# Patient Record
Sex: MALE | Race: WHITE | NOT HISPANIC OR LATINO | Employment: FULL TIME
[De-identification: names, ages, dates, MRNs, and addresses within clinical notes are randomized per-mention and may not be internally consistent; named-entity substitution may affect disease eponyms.]

---

## 2017-01-09 ENCOUNTER — CHARTING TRANS (OUTPATIENT)
Dept: OTHER | Age: 57
End: 2017-01-09

## 2017-01-11 ENCOUNTER — LAB SERVICES (OUTPATIENT)
Dept: OTHER | Age: 57
End: 2017-01-11

## 2017-01-11 LAB
ALBUMIN SERPL BCG-MCNC: 4 G/DL (ref 3.6–5.1)
ALP SERPL-CCNC: 72 U/L (ref 30–130)
ALT SERPL W/O P-5'-P-CCNC: 49 U/L (ref 17–47)
AST SERPL-CCNC: 30 U/L (ref 14–43)
BASOPHIL %: 0.7 % (ref 0–1.2)
BASOPHIL ABSOLUTE #: 0 10*3/UL (ref 0–0.1)
BILIRUB SERPL-MCNC: 0.7 MG/DL (ref 0–1.3)
BUN SERPL-MCNC: 21 MG/DL (ref 6–27)
CALCIUM SERPL-MCNC: 9.1 MG/DL (ref 8.6–10.6)
CHLORIDE SERPL-SCNC: 104 MMOL/L (ref 96–107)
CHOLEST SERPL-MCNC: 186 MG/DL (ref 140–200)
CREATININE, SERUM: 1.1 MG/DL (ref 0.6–1.6)
DIFFERENTIAL TYPE: NORMAL
EOSINOPHIL %: 6 % (ref 0–10)
EOSINOPHIL ABSOLUTE #: 0.3 10*3/UL (ref 0–0.5)
GFR SERPL CREATININE-BSD FRML MDRD: >60 ML/MIN/{1.73M2}
GFR SERPL CREATININE-BSD FRML MDRD: >60 ML/MIN/{1.73M2}
GLUCOSE P FAST SERPL-MCNC: 104 MG/DL (ref 60–100)
HCO3 SERPL-SCNC: 27 MMOL/L (ref 22–32)
HDLC SERPL-MCNC: 49 MG/DL
HEMATOCRIT: 42.7 % (ref 40–51)
HEMOGLOBIN: 14.1 G/DL (ref 13.7–17.5)
LDLC SERPL CALC-MCNC: 124 MG/DL (ref 30–100)
LYMPH PERCENT: 34.7 % (ref 20.5–51.1)
LYMPHOCYTE ABSOLUTE #: 1.6 10*3/UL (ref 1.2–3.4)
MEAN CORPUSCULAR HGB CONCENTRATION: 33 % (ref 32–36)
MEAN CORPUSCULAR HGB: 30.7 PG (ref 27–34)
MEAN CORPUSCULAR VOLUME: 93 FL (ref 79–95)
MEAN PLATELET VOLUME: 11.7 FL (ref 8.6–12.4)
MONOCYTE ABSOLUTE #: 0.5 10*3/UL (ref 0.2–0.9)
MONOCYTE PERCENT: 11.3 % (ref 4.3–12.9)
NEUTROPHIL ABSOLUTE #: 2.1 10*3/UL (ref 1.4–6.5)
NEUTROPHIL PERCENT: 47.3 % (ref 34–73.5)
PLATELET COUNT: 200 10*3/UL (ref 150–400)
POTASSIUM SERPL-SCNC: 5.1 MMOL/L (ref 3.5–5.3)
PROT SERPL-MCNC: 7.1 G/DL (ref 6.4–8.5)
RED BLOOD CELL COUNT: 4.59 10*6/UL (ref 3.9–5.7)
RED CELL DISTRIBUTION WIDTH: 12.9 % (ref 11.3–14.8)
SODIUM SERPL-SCNC: 144 MMOL/L (ref 136–146)
TRIGL SERPL-MCNC: 66 MG/DL (ref 0–200)
TSH SERPL DL<=0.05 MIU/L-ACNC: 1.2 M[IU]/L (ref 0.3–4.82)
WHITE BLOOD CELL COUNT: 4.5 10*3/UL (ref 4–10)

## 2017-01-18 ENCOUNTER — CHARTING TRANS (OUTPATIENT)
Dept: FAMILY MEDICINE | Age: 57
End: 2017-01-18

## 2017-03-03 ENCOUNTER — CHARTING TRANS (OUTPATIENT)
Dept: OTHER | Age: 57
End: 2017-03-03

## 2017-03-14 ENCOUNTER — CHARTING TRANS (OUTPATIENT)
Dept: OTHER | Age: 57
End: 2017-03-14

## 2017-03-16 ENCOUNTER — CHARTING TRANS (OUTPATIENT)
Dept: FAMILY MEDICINE | Age: 57
End: 2017-03-16

## 2017-03-16 ENCOUNTER — CHARTING TRANS (OUTPATIENT)
Dept: OTHER | Age: 57
End: 2017-03-16

## 2017-03-16 ENCOUNTER — IMAGING SERVICES (OUTPATIENT)
Dept: OTHER | Age: 57
End: 2017-03-16

## 2017-03-21 ENCOUNTER — CHARTING TRANS (OUTPATIENT)
Dept: OTHER | Age: 57
End: 2017-03-21

## 2017-03-30 ENCOUNTER — CHARTING TRANS (OUTPATIENT)
Dept: OTHER | Age: 57
End: 2017-03-30

## 2017-04-11 ENCOUNTER — IMAGING SERVICES (OUTPATIENT)
Dept: OTHER | Age: 57
End: 2017-04-11

## 2017-04-12 ENCOUNTER — CHARTING TRANS (OUTPATIENT)
Dept: OTHER | Age: 57
End: 2017-04-12

## 2018-01-15 ENCOUNTER — CHARTING TRANS (OUTPATIENT)
Dept: OTHER | Age: 58
End: 2018-01-15

## 2018-01-29 ENCOUNTER — CHARTING TRANS (OUTPATIENT)
Dept: OTHER | Age: 58
End: 2018-01-29

## 2018-03-08 ENCOUNTER — MYAURORA ACCOUNT LINK (OUTPATIENT)
Dept: OTHER | Age: 58
End: 2018-03-08

## 2018-03-08 ENCOUNTER — CHARTING TRANS (OUTPATIENT)
Dept: OTHER | Age: 58
End: 2018-03-08

## 2018-03-27 ENCOUNTER — CHARTING TRANS (OUTPATIENT)
Dept: OTHER | Age: 58
End: 2018-03-27

## 2018-04-02 ENCOUNTER — CHARTING TRANS (OUTPATIENT)
Dept: OTHER | Age: 58
End: 2018-04-02

## 2018-04-12 ENCOUNTER — LAB SERVICES (OUTPATIENT)
Dept: OTHER | Age: 58
End: 2018-04-12

## 2018-04-12 LAB
ALBUMIN SERPL-MCNC: 4.3 G/DL (ref 3.6–5.1)
ALP SERPL-CCNC: 79 U/L (ref 30–130)
ALT SERPL-CCNC: 37 U/L (ref 17–47)
AST SERPL-CCNC: 29 U/L (ref 14–43)
BILIRUB SERPL-MCNC: 0.4 MG/DL (ref 0–1.3)
BUN SERPL-MCNC: 24 MG/DL (ref 6–27)
CALCIUM SERPL-MCNC: 9.3 MG/DL (ref 8.6–10.6)
CHLORIDE SERPL-SCNC: 105 MMOL/L (ref 96–107)
CHOLEST SERPL-MCNC: 199 MG/DL (ref 140–200)
CO2 SERPL-SCNC: 26 MMOL/L (ref 22–32)
CREAT SERPL-MCNC: 1.1 MG/DL (ref 0.6–1.6)
GFR SERPL CREATININE-BSD FRML MDRD: >60 ML/MIN/{1.73M2}
GFR SERPL CREATININE-BSD FRML MDRD: >60 ML/MIN/{1.73M2}
GLUCOSE P FAST SERPL-MCNC: 94 MG/DL (ref 60–100)
HDLC SERPL-MCNC: 51 MG/DL
LDLC SERPL CALC-MCNC: 130 MG/DL (ref 30–100)
POTASSIUM SERPL-SCNC: 4.5 MMOL/L (ref 3.5–5.3)
PROT SERPL-MCNC: 7.1 G/DL (ref 6.4–8.5)
PSA SERPL-MCNC: 0.42 NG/ML (ref 0–3.6)
SODIUM SERPL-SCNC: 140 MMOL/L (ref 136–146)
TRIGL SERPL-MCNC: 92 MG/DL (ref 0–200)

## 2018-04-16 ENCOUNTER — CHARTING TRANS (OUTPATIENT)
Dept: OTHER | Age: 58
End: 2018-04-16

## 2018-04-16 ENCOUNTER — LAB SERVICES (OUTPATIENT)
Dept: OTHER | Age: 58
End: 2018-04-16

## 2018-04-16 ENCOUNTER — MYAURORA ACCOUNT LINK (OUTPATIENT)
Dept: OTHER | Age: 58
End: 2018-04-16

## 2018-04-18 ENCOUNTER — CHARTING TRANS (OUTPATIENT)
Dept: OTHER | Age: 58
End: 2018-04-18

## 2018-04-19 LAB — AP REPORT: NORMAL

## 2018-04-20 ENCOUNTER — CHARTING TRANS (OUTPATIENT)
Dept: OTHER | Age: 58
End: 2018-04-20

## 2018-11-02 VITALS
HEIGHT: 72 IN | SYSTOLIC BLOOD PRESSURE: 130 MMHG | TEMPERATURE: 99 F | WEIGHT: 290 LBS | HEART RATE: 60 BPM | BODY MASS INDEX: 39.28 KG/M2 | RESPIRATION RATE: 18 BRPM | DIASTOLIC BLOOD PRESSURE: 84 MMHG

## 2018-11-28 VITALS — DIASTOLIC BLOOD PRESSURE: 80 MMHG | SYSTOLIC BLOOD PRESSURE: 142 MMHG

## 2018-11-28 VITALS
HEIGHT: 71 IN | OXYGEN SATURATION: 95 % | TEMPERATURE: 99 F | HEART RATE: 68 BPM | SYSTOLIC BLOOD PRESSURE: 138 MMHG | WEIGHT: 300 LBS | BODY MASS INDEX: 42 KG/M2 | DIASTOLIC BLOOD PRESSURE: 84 MMHG

## 2018-11-29 VITALS
DIASTOLIC BLOOD PRESSURE: 80 MMHG | HEART RATE: 70 BPM | WEIGHT: 305 LBS | TEMPERATURE: 97.7 F | OXYGEN SATURATION: 98 % | HEIGHT: 71 IN | BODY MASS INDEX: 42.7 KG/M2 | SYSTOLIC BLOOD PRESSURE: 126 MMHG

## 2019-03-26 RX ORDER — VALSARTAN AND HYDROCHLOROTHIAZIDE 80; 12.5 MG/1; MG/1
1 TABLET, FILM COATED ORAL DAILY
COMMUNITY
Start: 2018-03-08 | End: 2019-03-26 | Stop reason: SDUPTHER

## 2019-03-26 RX ORDER — OMEPRAZOLE 40 MG/1
40 CAPSULE, DELAYED RELEASE ORAL DAILY
Qty: 60 CAPSULE | Refills: 0 | Status: SHIPPED | OUTPATIENT
Start: 2019-03-26 | End: 2019-04-22 | Stop reason: SDUPTHER

## 2019-03-26 RX ORDER — OMEPRAZOLE 40 MG/1
40 CAPSULE, DELAYED RELEASE ORAL DAILY
COMMUNITY
Start: 2018-03-08 | End: 2019-03-26 | Stop reason: SDUPTHER

## 2019-03-26 RX ORDER — VALSARTAN AND HYDROCHLOROTHIAZIDE 80; 12.5 MG/1; MG/1
1 TABLET, FILM COATED ORAL DAILY
Qty: 60 TABLET | Refills: 0 | Status: SHIPPED | OUTPATIENT
Start: 2019-03-26 | End: 2019-04-22 | Stop reason: SDUPTHER

## 2019-04-22 ENCOUNTER — OFFICE VISIT (OUTPATIENT)
Dept: FAMILY MEDICINE | Age: 59
End: 2019-04-22

## 2019-04-22 VITALS
BODY MASS INDEX: 40.6 KG/M2 | SYSTOLIC BLOOD PRESSURE: 134 MMHG | OXYGEN SATURATION: 96 % | HEIGHT: 71 IN | WEIGHT: 290 LBS | TEMPERATURE: 98.4 F | DIASTOLIC BLOOD PRESSURE: 84 MMHG | HEART RATE: 68 BPM

## 2019-04-22 DIAGNOSIS — I10 ESSENTIAL HYPERTENSION: ICD-10-CM

## 2019-04-22 DIAGNOSIS — Z12.5 SCREENING FOR MALIGNANT NEOPLASM OF PROSTATE: ICD-10-CM

## 2019-04-22 DIAGNOSIS — E66.01 MORBID OBESITY (CMD): ICD-10-CM

## 2019-04-22 DIAGNOSIS — Z11.59 ENCOUNTER FOR HEPATITIS C SCREENING TEST FOR LOW RISK PATIENT: ICD-10-CM

## 2019-04-22 DIAGNOSIS — Z00.01 ENCOUNTER FOR GENERAL ADULT MEDICAL EXAMINATION WITH ABNORMAL FINDINGS: Primary | ICD-10-CM

## 2019-04-22 DIAGNOSIS — G47.33 OSA ON CPAP: ICD-10-CM

## 2019-04-22 PROCEDURE — 99396 PREV VISIT EST AGE 40-64: CPT | Performed by: FAMILY MEDICINE

## 2019-04-22 PROCEDURE — 3075F SYST BP GE 130 - 139MM HG: CPT | Performed by: FAMILY MEDICINE

## 2019-04-22 PROCEDURE — 3079F DIAST BP 80-89 MM HG: CPT | Performed by: FAMILY MEDICINE

## 2019-04-22 RX ORDER — OMEPRAZOLE 40 MG/1
40 CAPSULE, DELAYED RELEASE ORAL DAILY
Qty: 90 CAPSULE | Refills: 3 | Status: SHIPPED | OUTPATIENT
Start: 2019-04-22 | End: 2020-05-07 | Stop reason: SDUPTHER

## 2019-04-22 RX ORDER — VALSARTAN AND HYDROCHLOROTHIAZIDE 80; 12.5 MG/1; MG/1
1 TABLET, FILM COATED ORAL DAILY
Qty: 90 TABLET | Refills: 3 | Status: SHIPPED | OUTPATIENT
Start: 2019-04-22 | End: 2020-05-07 | Stop reason: SDUPTHER

## 2019-04-22 ASSESSMENT — PATIENT HEALTH QUESTIONNAIRE - PHQ9
SUM OF ALL RESPONSES TO PHQ9 QUESTIONS 1 AND 2: 1
1. LITTLE INTEREST OR PLEASURE IN DOING THINGS: NOT AT ALL
SUM OF ALL RESPONSES TO PHQ9 QUESTIONS 1 AND 2: 1
2. FEELING DOWN, DEPRESSED OR HOPELESS: SEVERAL DAYS

## 2019-09-25 ENCOUNTER — LAB SERVICES (OUTPATIENT)
Dept: LAB | Age: 59
End: 2019-09-25

## 2019-09-25 ENCOUNTER — OFFICE VISIT (OUTPATIENT)
Dept: FAMILY MEDICINE | Age: 59
End: 2019-09-25

## 2019-09-25 VITALS — TEMPERATURE: 96.7 F | OXYGEN SATURATION: 96 % | WEIGHT: 296.8 LBS | BODY MASS INDEX: 41.4 KG/M2

## 2019-09-25 DIAGNOSIS — H92.02 LEFT EAR PAIN: ICD-10-CM

## 2019-09-25 DIAGNOSIS — R59.9 ENLARGED LYMPH NODES: ICD-10-CM

## 2019-09-25 DIAGNOSIS — H53.9 VISUAL DISTURBANCE: Primary | ICD-10-CM

## 2019-09-25 LAB
BASOPHIL %: 0.5 % (ref 0–1.2)
BASOPHIL ABSOLUTE #: 0 10*3/UL (ref 0–0.1)
DIFFERENTIAL TYPE: NORMAL
EOSINOPHIL %: 5.2 % (ref 0–10)
EOSINOPHIL ABSOLUTE #: 0.3 10*3/UL (ref 0–0.5)
HEMATOCRIT: 44.1 % (ref 40–51)
HEMOGLOBIN: 14.3 G/DL (ref 13.7–17.5)
LYMPH PERCENT: 33.8 % (ref 20.5–51.1)
LYMPHOCYTE ABSOLUTE #: 2 10*3/UL (ref 1.2–3.4)
MEAN CORPUSCULAR HGB CONCENTRATION: 32.4 % (ref 32–36)
MEAN CORPUSCULAR HGB: 30.8 PG (ref 27–34)
MEAN CORPUSCULAR VOLUME: 94.8 FL (ref 79–95)
MEAN PLATELET VOLUME: 11.9 FL (ref 8.6–12.4)
MONOCYTE ABSOLUTE #: 0.6 10*3/UL (ref 0.2–0.9)
MONOCYTE PERCENT: 10.3 % (ref 4.3–12.9)
NEUTROPHIL ABSOLUTE #: 3 10*3/UL (ref 1.4–6.5)
NEUTROPHIL PERCENT: 50.2 % (ref 34–73.5)
PLATELET COUNT: 207 10*3/UL (ref 150–400)
RED BLOOD CELL COUNT: 4.65 10*6/UL (ref 3.9–5.7)
RED CELL DISTRIBUTION WIDTH: 13 % (ref 11.3–14.8)
WHITE BLOOD CELL COUNT: 6 10*3/UL (ref 4–10)

## 2019-09-25 PROCEDURE — 85025 COMPLETE CBC W/AUTO DIFF WBC: CPT | Performed by: NURSE PRACTITIONER

## 2019-09-25 PROCEDURE — 99213 OFFICE O/P EST LOW 20 MIN: CPT | Performed by: NURSE PRACTITIONER

## 2019-09-25 PROCEDURE — 36415 COLL VENOUS BLD VENIPUNCTURE: CPT | Performed by: NURSE PRACTITIONER

## 2019-09-25 ASSESSMENT — ENCOUNTER SYMPTOMS
GASTROINTESTINAL NEGATIVE: 1
CONSTITUTIONAL NEGATIVE: 1
RESPIRATORY NEGATIVE: 1

## 2019-09-30 ENCOUNTER — E-ADVICE (OUTPATIENT)
Dept: FAMILY MEDICINE | Age: 59
End: 2019-09-30

## 2019-09-30 DIAGNOSIS — H92.02 LEFT EAR PAIN: Primary | ICD-10-CM

## 2019-10-10 ENCOUNTER — OFFICE VISIT (OUTPATIENT)
Dept: OTOLARYNGOLOGY | Age: 59
End: 2019-10-10
Attending: NURSE PRACTITIONER

## 2019-10-10 ENCOUNTER — OFFICE VISIT (OUTPATIENT)
Dept: AUDIOLOGY | Age: 59
End: 2019-10-10
Attending: OTOLARYNGOLOGY

## 2019-10-10 VITALS — HEIGHT: 72 IN | BODY MASS INDEX: 39.28 KG/M2 | WEIGHT: 290 LBS

## 2019-10-10 DIAGNOSIS — H69.90 DYSFUNCTION OF EUSTACHIAN TUBE, UNSPECIFIED LATERALITY: ICD-10-CM

## 2019-10-10 DIAGNOSIS — H91.90 HEARING LOSS, UNSPECIFIED HEARING LOSS TYPE, UNSPECIFIED LATERALITY: Primary | ICD-10-CM

## 2019-10-10 DIAGNOSIS — K01.1 IMPACTED MOLAR: ICD-10-CM

## 2019-10-10 DIAGNOSIS — H90.3 SENSORINEURAL HEARING LOSS (SNHL) OF BOTH EARS: Primary | ICD-10-CM

## 2019-10-10 PROCEDURE — 92557 COMPREHENSIVE HEARING TEST: CPT | Performed by: AUDIOLOGIST

## 2019-10-10 PROCEDURE — 92567 TYMPANOMETRY: CPT | Performed by: AUDIOLOGIST

## 2019-10-10 PROCEDURE — 99244 OFF/OP CNSLTJ NEW/EST MOD 40: CPT | Performed by: OTOLARYNGOLOGY

## 2019-10-10 PROCEDURE — 92504 EAR MICROSCOPY EXAMINATION: CPT | Performed by: OTOLARYNGOLOGY

## 2019-10-10 RX ORDER — FLUTICASONE PROPIONATE 50 MCG
2 SPRAY, SUSPENSION (ML) NASAL DAILY
Qty: 1 BOTTLE | Refills: 3 | Status: SHIPPED | OUTPATIENT
Start: 2019-10-10 | End: 2022-05-23 | Stop reason: SDUPTHER

## 2019-11-27 ENCOUNTER — WALK IN (OUTPATIENT)
Dept: URGENT CARE | Age: 59
End: 2019-11-27

## 2019-11-27 VITALS
SYSTOLIC BLOOD PRESSURE: 128 MMHG | DIASTOLIC BLOOD PRESSURE: 88 MMHG | HEART RATE: 71 BPM | OXYGEN SATURATION: 98 % | RESPIRATION RATE: 18 BRPM | TEMPERATURE: 97.8 F

## 2019-11-27 DIAGNOSIS — B02.9 HERPES ZOSTER WITHOUT COMPLICATION: Primary | ICD-10-CM

## 2019-11-27 PROCEDURE — 99214 OFFICE O/P EST MOD 30 MIN: CPT | Performed by: NURSE PRACTITIONER

## 2019-11-27 RX ORDER — VALACYCLOVIR HYDROCHLORIDE 1 G/1
1000 TABLET, FILM COATED ORAL 3 TIMES DAILY
Qty: 21 TABLET | Refills: 0 | Status: SHIPPED | OUTPATIENT
Start: 2019-11-27 | End: 2019-12-04

## 2019-11-27 ASSESSMENT — ENCOUNTER SYMPTOMS
CONSTITUTIONAL NEGATIVE: 1
RESPIRATORY NEGATIVE: 1

## 2019-12-17 ENCOUNTER — WALK IN (OUTPATIENT)
Dept: URGENT CARE | Age: 59
End: 2019-12-17

## 2019-12-17 ENCOUNTER — IMAGING SERVICES (OUTPATIENT)
Dept: GENERAL RADIOLOGY | Age: 59
End: 2019-12-17
Attending: FAMILY MEDICINE

## 2019-12-17 DIAGNOSIS — S99.912A INJURY OF LEFT ANKLE, INITIAL ENCOUNTER: ICD-10-CM

## 2019-12-17 DIAGNOSIS — S99.912A INJURY OF LEFT ANKLE, INITIAL ENCOUNTER: Primary | ICD-10-CM

## 2019-12-17 DIAGNOSIS — S99.922A INJURY OF LEFT FOOT, INITIAL ENCOUNTER: ICD-10-CM

## 2019-12-17 PROCEDURE — 73610 X-RAY EXAM OF ANKLE: CPT | Performed by: RADIOLOGY

## 2019-12-17 PROCEDURE — 99214 OFFICE O/P EST MOD 30 MIN: CPT | Performed by: FAMILY MEDICINE

## 2019-12-17 PROCEDURE — 73630 X-RAY EXAM OF FOOT: CPT | Performed by: RADIOLOGY

## 2019-12-17 RX ORDER — FLUTICASONE PROPIONATE 50 MCG
SPRAY, SUSPENSION (ML) NASAL
Refills: 3 | COMMUNITY
Start: 2019-12-11 | End: 2020-05-12 | Stop reason: ALTCHOICE

## 2019-12-17 SDOH — HEALTH STABILITY: MENTAL HEALTH: HOW OFTEN DO YOU HAVE A DRINK CONTAINING ALCOHOL?: 4 OR MORE TIMES A WEEK

## 2019-12-17 ASSESSMENT — PAIN SCALES - GENERAL: PAINLEVEL: 1-2

## 2019-12-27 ENCOUNTER — E-ADVICE (OUTPATIENT)
Dept: FAMILY MEDICINE | Age: 59
End: 2019-12-27

## 2019-12-27 DIAGNOSIS — M79.672 LEFT FOOT PAIN: Primary | ICD-10-CM

## 2019-12-27 DIAGNOSIS — M79.673 PAIN OF FOOT AND TOES: ICD-10-CM

## 2019-12-27 DIAGNOSIS — M79.676 PAIN OF FOOT AND TOES: ICD-10-CM

## 2019-12-30 ENCOUNTER — E-ADVICE (OUTPATIENT)
Dept: FAMILY MEDICINE | Age: 59
End: 2019-12-30

## 2020-03-09 RX ORDER — FLUTICASONE PROPIONATE 50 MCG
SPRAY, SUSPENSION (ML) NASAL
Qty: 16 G | Refills: 0 | OUTPATIENT
Start: 2020-03-09

## 2020-04-07 RX ORDER — FLUTICASONE PROPIONATE 50 MCG
SPRAY, SUSPENSION (ML) NASAL
Qty: 16 G | OUTPATIENT
Start: 2020-04-07

## 2020-05-07 RX ORDER — OMEPRAZOLE 40 MG/1
40 CAPSULE, DELAYED RELEASE ORAL DAILY
Qty: 90 CAPSULE | Refills: 0 | Status: SHIPPED | OUTPATIENT
Start: 2020-05-07 | End: 2020-05-12 | Stop reason: SDUPTHER

## 2020-05-08 ENCOUNTER — E-ADVICE (OUTPATIENT)
Dept: FAMILY MEDICINE | Age: 60
End: 2020-05-08

## 2020-05-08 RX ORDER — VALSARTAN AND HYDROCHLOROTHIAZIDE 80; 12.5 MG/1; MG/1
1 TABLET, FILM COATED ORAL DAILY
Qty: 90 TABLET | Refills: 0 | Status: SHIPPED | OUTPATIENT
Start: 2020-05-08 | End: 2020-05-12 | Stop reason: SDUPTHER

## 2020-05-12 ENCOUNTER — V-VISIT (OUTPATIENT)
Dept: FAMILY MEDICINE | Age: 60
End: 2020-05-12

## 2020-05-12 DIAGNOSIS — K22.70 BARRETT'S ESOPHAGUS WITHOUT DYSPLASIA: ICD-10-CM

## 2020-05-12 DIAGNOSIS — I10 ESSENTIAL HYPERTENSION: Primary | ICD-10-CM

## 2020-05-12 DIAGNOSIS — E78.5 HYPERLIPIDEMIA, UNSPECIFIED HYPERLIPIDEMIA TYPE: ICD-10-CM

## 2020-05-12 DIAGNOSIS — Z12.5 SCREENING FOR MALIGNANT NEOPLASM OF PROSTATE: ICD-10-CM

## 2020-05-12 DIAGNOSIS — Z11.59 ENCOUNTER FOR HEPATITIS C SCREENING TEST FOR LOW RISK PATIENT: ICD-10-CM

## 2020-05-12 PROCEDURE — 99214 OFFICE O/P EST MOD 30 MIN: CPT | Performed by: FAMILY MEDICINE

## 2020-05-12 RX ORDER — VALSARTAN AND HYDROCHLOROTHIAZIDE 80; 12.5 MG/1; MG/1
1 TABLET, FILM COATED ORAL DAILY
Qty: 90 TABLET | Refills: 3 | Status: SHIPPED | OUTPATIENT
Start: 2020-05-12 | End: 2020-11-17 | Stop reason: SDUPTHER

## 2020-05-12 RX ORDER — OMEPRAZOLE 40 MG/1
40 CAPSULE, DELAYED RELEASE ORAL DAILY
Qty: 90 CAPSULE | Refills: 3 | Status: SHIPPED | OUTPATIENT
Start: 2020-05-12 | End: 2021-07-31

## 2020-05-22 ENCOUNTER — NURSE ONLY (OUTPATIENT)
Dept: FAMILY MEDICINE | Age: 60
End: 2020-05-22

## 2020-05-22 ENCOUNTER — LAB SERVICES (OUTPATIENT)
Dept: LAB | Age: 60
End: 2020-05-22

## 2020-05-22 VITALS — DIASTOLIC BLOOD PRESSURE: 82 MMHG | SYSTOLIC BLOOD PRESSURE: 122 MMHG | HEART RATE: 62 BPM

## 2020-05-22 DIAGNOSIS — I10 ESSENTIAL HYPERTENSION: ICD-10-CM

## 2020-05-22 DIAGNOSIS — Z12.5 SCREENING FOR MALIGNANT NEOPLASM OF PROSTATE: ICD-10-CM

## 2020-05-22 DIAGNOSIS — I10 ESSENTIAL HYPERTENSION: Primary | ICD-10-CM

## 2020-05-22 DIAGNOSIS — Z11.59 ENCOUNTER FOR HEPATITIS C SCREENING TEST FOR LOW RISK PATIENT: ICD-10-CM

## 2020-05-22 DIAGNOSIS — E78.5 HYPERLIPIDEMIA, UNSPECIFIED HYPERLIPIDEMIA TYPE: ICD-10-CM

## 2020-05-22 LAB
ALBUMIN SERPL-MCNC: 4.2 G/DL (ref 3.6–5.1)
ALP SERPL-CCNC: 69 U/L (ref 45–115)
ALT SERPL W/O P-5'-P-CCNC: 29 U/L (ref 5–49)
AST SERPL-CCNC: 27 U/L (ref 14–43)
BILIRUB SERPL-MCNC: 0.5 MG/DL (ref 0–1.3)
BUN SERPL-MCNC: 22 MG/DL (ref 6–27)
CALCIUM SERPL-MCNC: 9.6 MG/DL (ref 8.6–10.6)
CHLORIDE SERPL-SCNC: 103 MMOL/L (ref 96–107)
CHOLEST SERPL-MCNC: 189 MG/DL (ref 140–200)
CO2 SERPL-SCNC: 29 MMOL/L (ref 22–32)
CREAT SERPL-MCNC: 1.4 MG/DL (ref 0.6–1.6)
GFR SERPL CREATININE-BSD FRML MDRD: 52 ML/MIN/{1.73M2}
GFR SERPL CREATININE-BSD FRML MDRD: >60 ML/MIN/{1.73M2}
GLUCOSE P FAST SERPL-MCNC: 96 MG/DL (ref 60–100)
HDLC SERPL-MCNC: 53 MG/DL
HEPATITIS C ANTIBODY: NEGATIVE
LDLC SERPL CALC-MCNC: 125 MG/DL (ref 30–100)
POTASSIUM SERPL-SCNC: 4.5 MMOL/L (ref 3.5–5.3)
PROT SERPL-MCNC: 6.9 G/DL (ref 6.4–8.5)
PSA SERPL-MCNC: 0.43 NG/ML (ref 0–3.8)
SODIUM SERPL-SCNC: 137 MMOL/L (ref 136–146)
TRIGL SERPL-MCNC: 57 MG/DL (ref 0–200)

## 2020-05-22 PROCEDURE — 86803 HEPATITIS C AB TEST: CPT | Performed by: FAMILY MEDICINE

## 2020-05-22 PROCEDURE — 36415 COLL VENOUS BLD VENIPUNCTURE: CPT | Performed by: FAMILY MEDICINE

## 2020-05-22 PROCEDURE — 80061 LIPID PANEL: CPT | Performed by: FAMILY MEDICINE

## 2020-05-22 PROCEDURE — G0103 PSA SCREENING: HCPCS | Performed by: FAMILY MEDICINE

## 2020-05-22 PROCEDURE — 80053 COMPREHEN METABOLIC PANEL: CPT | Performed by: FAMILY MEDICINE

## 2020-06-29 ENCOUNTER — E-ADVICE (OUTPATIENT)
Dept: FAMILY MEDICINE | Age: 60
End: 2020-06-29

## 2020-06-29 ENCOUNTER — TELEPHONE (OUTPATIENT)
Dept: FAMILY MEDICINE | Age: 60
End: 2020-06-29

## 2020-06-29 DIAGNOSIS — I10 ESSENTIAL HYPERTENSION: Primary | ICD-10-CM

## 2020-07-13 ENCOUNTER — OFFICE VISIT (OUTPATIENT)
Dept: FAMILY MEDICINE | Age: 60
End: 2020-07-13

## 2020-07-13 VITALS
BODY MASS INDEX: 37.84 KG/M2 | DIASTOLIC BLOOD PRESSURE: 78 MMHG | TEMPERATURE: 98 F | RESPIRATION RATE: 20 BRPM | OXYGEN SATURATION: 99 % | HEART RATE: 58 BPM | SYSTOLIC BLOOD PRESSURE: 142 MMHG | WEIGHT: 279 LBS

## 2020-07-13 DIAGNOSIS — I10 ESSENTIAL HYPERTENSION: ICD-10-CM

## 2020-07-13 DIAGNOSIS — N52.9 ERECTILE DYSFUNCTION, UNSPECIFIED ERECTILE DYSFUNCTION TYPE: ICD-10-CM

## 2020-07-13 DIAGNOSIS — M79.671 BILATERAL FOOT PAIN: Primary | ICD-10-CM

## 2020-07-13 DIAGNOSIS — F90.9 ADULT ADHD: ICD-10-CM

## 2020-07-13 DIAGNOSIS — M79.672 BILATERAL FOOT PAIN: Primary | ICD-10-CM

## 2020-07-13 PROCEDURE — 99214 OFFICE O/P EST MOD 30 MIN: CPT | Performed by: NURSE PRACTITIONER

## 2020-07-13 PROCEDURE — 3078F DIAST BP <80 MM HG: CPT | Performed by: NURSE PRACTITIONER

## 2020-07-13 PROCEDURE — 3077F SYST BP >= 140 MM HG: CPT | Performed by: NURSE PRACTITIONER

## 2020-07-13 RX ORDER — DEXTROAMPHETAMINE SACCHARATE, AMPHETAMINE ASPARTATE MONOHYDRATE, DEXTROAMPHETAMINE SULFATE AND AMPHETAMINE SULFATE 2.5; 2.5; 2.5; 2.5 MG/1; MG/1; MG/1; MG/1
10 CAPSULE, EXTENDED RELEASE ORAL DAILY
Qty: 30 CAPSULE | Refills: 0 | Status: SHIPPED | OUTPATIENT
Start: 2020-07-13 | End: 2020-08-19 | Stop reason: SDUPTHER

## 2020-07-13 RX ORDER — SILDENAFIL 100 MG/1
100 TABLET, FILM COATED ORAL PRN
Qty: 9 TABLET | Refills: 0 | Status: SHIPPED | OUTPATIENT
Start: 2020-07-13

## 2020-07-13 SDOH — HEALTH STABILITY: PHYSICAL HEALTH: ON AVERAGE, HOW MANY MINUTES DO YOU ENGAGE IN EXERCISE AT THIS LEVEL?: 60 MIN

## 2020-07-13 SDOH — HEALTH STABILITY: PHYSICAL HEALTH: ON AVERAGE, HOW MANY DAYS PER WEEK DO YOU ENGAGE IN MODERATE TO STRENUOUS EXERCISE (LIKE A BRISK WALK)?: 7 DAYS

## 2020-07-13 ASSESSMENT — PATIENT HEALTH QUESTIONNAIRE - PHQ9
1. LITTLE INTEREST OR PLEASURE IN DOING THINGS: SEVERAL DAYS
7. TROUBLE CONCENTRATING ON THINGS, SUCH AS READING THE NEWSPAPER OR WATCHING TELEVISION: NEARLY EVERY DAY
4. FEELING TIRED OR HAVING LITTLE ENERGY: NEARLY EVERY DAY
CLINICAL INTERPRETATION OF PHQ2 SCORE: NO FURTHER SCREENING NEEDED
2. FEELING DOWN, DEPRESSED OR HOPELESS: SEVERAL DAYS
5. POOR APPETITE, WEIGHT LOSS, OR OVEREATING: NOT AT ALL
8. MOVING OR SPEAKING SO SLOWLY THAT OTHER PEOPLE COULD HAVE NOTICED. OR THE OPPOSITE, BEING SO FIGETY OR RESTLESS THAT YOU HAVE BEEN MOVING AROUND A LOT MORE THAN USUAL: NEARLY EVERY DAY
10. IF YOU CHECKED OFF ANY PROBLEMS, HOW DIFFICULT HAVE THESE PROBLEMS MADE IT FOR YOU TO DO YOUR WORK, TAKE CARE OF THINGS AT HOME, OR GET ALONG WITH OTHER PEOPLE: NOT DIFFICULT AT ALL
CLINICAL INTERPRETATION OF PHQ9 SCORE: MODERATE DEPRESSION
CLINICAL INTERPRETATION OF PHQ2 SCORE: MODERATE DEPRESSION
SUM OF ALL RESPONSES TO PHQ9 QUESTIONS 1 AND 2: 2
9. THOUGHTS THAT YOU WOULD BE BETTER OFF DEAD, OR OF HURTING YOURSELF: NOT AT ALL
SUM OF ALL RESPONSES TO PHQ9 QUESTIONS 1 TO 9: 12
SUM OF ALL RESPONSES TO PHQ QUESTIONS 1-9: 12
CLINICAL INTERPRETATION OF PHQ9 SCORE: NO FURTHER SCREENING NEEDED
6. FEELING BAD ABOUT YOURSELF - OR THAT YOU ARE A FAILURE OR HAVE LET YOURSELF OR YOUR FAMILY DOWN: NOT AT ALL
SUM OF ALL RESPONSES TO PHQ9 QUESTIONS 1 AND 2: 2
3. TROUBLE FALLING OR STAYING ASLEEP OR SLEEPING TOO MUCH: SEVERAL DAYS

## 2020-07-13 ASSESSMENT — ENCOUNTER SYMPTOMS
EYES NEGATIVE: 1
GASTROINTESTINAL NEGATIVE: 1
CONSTITUTIONAL NEGATIVE: 1
RESPIRATORY NEGATIVE: 1
NEUROLOGICAL NEGATIVE: 1

## 2020-07-29 ENCOUNTER — E-ADVICE (OUTPATIENT)
Dept: FAMILY MEDICINE | Age: 60
End: 2020-07-29

## 2020-08-19 ENCOUNTER — E-ADVICE (OUTPATIENT)
Dept: FAMILY MEDICINE | Age: 60
End: 2020-08-19

## 2020-08-19 DIAGNOSIS — F90.9 ADULT ADHD: ICD-10-CM

## 2020-08-19 RX ORDER — DEXTROAMPHETAMINE SACCHARATE, AMPHETAMINE ASPARTATE, DEXTROAMPHETAMINE SULFATE AND AMPHETAMINE SULFATE 2.5; 2.5; 2.5; 2.5 MG/1; MG/1; MG/1; MG/1
TABLET ORAL
Qty: 30 TABLET | Refills: 0 | Status: SHIPPED | OUTPATIENT
Start: 2020-08-19 | End: 2020-09-17 | Stop reason: SDUPTHER

## 2020-08-19 RX ORDER — DEXTROAMPHETAMINE SACCHARATE, AMPHETAMINE ASPARTATE MONOHYDRATE, DEXTROAMPHETAMINE SULFATE AND AMPHETAMINE SULFATE 2.5; 2.5; 2.5; 2.5 MG/1; MG/1; MG/1; MG/1
10 CAPSULE, EXTENDED RELEASE ORAL DAILY
Qty: 30 CAPSULE | Refills: 0 | Status: SHIPPED | OUTPATIENT
Start: 2020-08-19 | End: 2020-09-17 | Stop reason: SDUPTHER

## 2020-08-19 RX ORDER — DEXTROAMPHETAMINE SACCHARATE, AMPHETAMINE ASPARTATE MONOHYDRATE, DEXTROAMPHETAMINE SULFATE AND AMPHETAMINE SULFATE 2.5; 2.5; 2.5; 2.5 MG/1; MG/1; MG/1; MG/1
10 CAPSULE, EXTENDED RELEASE ORAL DAILY
Qty: 30 CAPSULE | Refills: 0 | OUTPATIENT
Start: 2020-08-19

## 2020-09-17 RX ORDER — DEXTROAMPHETAMINE SACCHARATE, AMPHETAMINE ASPARTATE, DEXTROAMPHETAMINE SULFATE AND AMPHETAMINE SULFATE 2.5; 2.5; 2.5; 2.5 MG/1; MG/1; MG/1; MG/1
TABLET ORAL
Qty: 30 TABLET | Refills: 0 | Status: SHIPPED | OUTPATIENT
Start: 2020-09-17 | End: 2020-10-22 | Stop reason: SDUPTHER

## 2020-09-17 RX ORDER — DEXTROAMPHETAMINE SACCHARATE, AMPHETAMINE ASPARTATE MONOHYDRATE, DEXTROAMPHETAMINE SULFATE AND AMPHETAMINE SULFATE 2.5; 2.5; 2.5; 2.5 MG/1; MG/1; MG/1; MG/1
10 CAPSULE, EXTENDED RELEASE ORAL DAILY
Qty: 30 CAPSULE | Refills: 0 | Status: SHIPPED | OUTPATIENT
Start: 2020-09-17 | End: 2020-10-22 | Stop reason: SDUPTHER

## 2020-10-22 ENCOUNTER — E-ADVICE (OUTPATIENT)
Dept: FAMILY MEDICINE | Age: 60
End: 2020-10-22

## 2020-10-22 DIAGNOSIS — F90.9 ADULT ADHD: ICD-10-CM

## 2020-10-26 RX ORDER — DEXTROAMPHETAMINE SACCHARATE, AMPHETAMINE ASPARTATE MONOHYDRATE, DEXTROAMPHETAMINE SULFATE AND AMPHETAMINE SULFATE 2.5; 2.5; 2.5; 2.5 MG/1; MG/1; MG/1; MG/1
10 CAPSULE, EXTENDED RELEASE ORAL DAILY
Qty: 30 CAPSULE | Refills: 0 | Status: SHIPPED | OUTPATIENT
Start: 2020-10-26 | End: 2020-11-17 | Stop reason: SDUPTHER

## 2020-10-26 RX ORDER — DEXTROAMPHETAMINE SACCHARATE, AMPHETAMINE ASPARTATE MONOHYDRATE, DEXTROAMPHETAMINE SULFATE AND AMPHETAMINE SULFATE 2.5; 2.5; 2.5; 2.5 MG/1; MG/1; MG/1; MG/1
10 CAPSULE, EXTENDED RELEASE ORAL DAILY
Qty: 30 CAPSULE | Refills: 0 | Status: SHIPPED | OUTPATIENT
Start: 2020-10-26 | End: 2020-10-26 | Stop reason: SDUPTHER

## 2020-10-26 RX ORDER — DEXTROAMPHETAMINE SACCHARATE, AMPHETAMINE ASPARTATE, DEXTROAMPHETAMINE SULFATE AND AMPHETAMINE SULFATE 2.5; 2.5; 2.5; 2.5 MG/1; MG/1; MG/1; MG/1
TABLET ORAL
Qty: 30 TABLET | Refills: 0 | Status: SHIPPED | OUTPATIENT
Start: 2020-10-26 | End: 2020-10-26 | Stop reason: SDUPTHER

## 2020-10-26 RX ORDER — DEXTROAMPHETAMINE SACCHARATE, AMPHETAMINE ASPARTATE, DEXTROAMPHETAMINE SULFATE AND AMPHETAMINE SULFATE 2.5; 2.5; 2.5; 2.5 MG/1; MG/1; MG/1; MG/1
TABLET ORAL
Qty: 30 TABLET | Refills: 0 | Status: SHIPPED | OUTPATIENT
Start: 2020-10-26 | End: 2020-11-17 | Stop reason: SDUPTHER

## 2020-11-17 ENCOUNTER — OFFICE VISIT (OUTPATIENT)
Dept: FAMILY MEDICINE | Age: 60
End: 2020-11-17

## 2020-11-17 VITALS
HEART RATE: 80 BPM | WEIGHT: 277.8 LBS | DIASTOLIC BLOOD PRESSURE: 90 MMHG | SYSTOLIC BLOOD PRESSURE: 122 MMHG | TEMPERATURE: 97.8 F | BODY MASS INDEX: 37.68 KG/M2 | RESPIRATION RATE: 20 BRPM

## 2020-11-17 DIAGNOSIS — Z23 NEED FOR VACCINATION: ICD-10-CM

## 2020-11-17 DIAGNOSIS — N52.9 ERECTILE DYSFUNCTION, UNSPECIFIED ERECTILE DYSFUNCTION TYPE: ICD-10-CM

## 2020-11-17 DIAGNOSIS — F90.9 ADULT ADHD: Primary | ICD-10-CM

## 2020-11-17 DIAGNOSIS — K22.70 BARRETT'S ESOPHAGUS WITHOUT DYSPLASIA: ICD-10-CM

## 2020-11-17 DIAGNOSIS — K21.9 GASTROESOPHAGEAL REFLUX DISEASE WITHOUT ESOPHAGITIS: ICD-10-CM

## 2020-11-17 DIAGNOSIS — I10 ESSENTIAL HYPERTENSION: ICD-10-CM

## 2020-11-17 PROCEDURE — 90686 IIV4 VACC NO PRSV 0.5 ML IM: CPT

## 2020-11-17 PROCEDURE — 99214 OFFICE O/P EST MOD 30 MIN: CPT | Performed by: NURSE PRACTITIONER

## 2020-11-17 PROCEDURE — 3074F SYST BP LT 130 MM HG: CPT | Performed by: NURSE PRACTITIONER

## 2020-11-17 PROCEDURE — 90471 IMMUNIZATION ADMIN: CPT

## 2020-11-17 RX ORDER — DEXTROAMPHETAMINE SACCHARATE, AMPHETAMINE ASPARTATE MONOHYDRATE, DEXTROAMPHETAMINE SULFATE AND AMPHETAMINE SULFATE 2.5; 2.5; 2.5; 2.5 MG/1; MG/1; MG/1; MG/1
10 CAPSULE, EXTENDED RELEASE ORAL DAILY
Qty: 30 CAPSULE | Refills: 0 | Status: SHIPPED | OUTPATIENT
Start: 2020-11-17 | End: 2020-11-25 | Stop reason: SDUPTHER

## 2020-11-17 RX ORDER — VALSARTAN AND HYDROCHLOROTHIAZIDE 80; 12.5 MG/1; MG/1
1 TABLET, FILM COATED ORAL DAILY
Qty: 90 TABLET | Refills: 3 | Status: SHIPPED | OUTPATIENT
Start: 2020-11-17 | End: 2021-08-23 | Stop reason: SDUPTHER

## 2020-11-17 RX ORDER — DEXTROAMPHETAMINE SACCHARATE, AMPHETAMINE ASPARTATE, DEXTROAMPHETAMINE SULFATE AND AMPHETAMINE SULFATE 2.5; 2.5; 2.5; 2.5 MG/1; MG/1; MG/1; MG/1
TABLET ORAL
Qty: 30 TABLET | Refills: 0 | Status: SHIPPED | OUTPATIENT
Start: 2020-11-17 | End: 2020-11-25 | Stop reason: SDUPTHER

## 2020-11-17 RX ORDER — FLUTICASONE PROPIONATE 50 MCG
2 SPRAY, SUSPENSION (ML) NASAL DAILY
Qty: 16 G | Refills: 12 | Status: SHIPPED | OUTPATIENT
Start: 2020-11-17 | End: 2021-08-23 | Stop reason: ALTCHOICE

## 2020-11-17 ASSESSMENT — PATIENT HEALTH QUESTIONNAIRE - PHQ9
SUM OF ALL RESPONSES TO PHQ9 QUESTIONS 1 AND 2: 0
CLINICAL INTERPRETATION OF PHQ2 SCORE: NO FURTHER SCREENING NEEDED
SUM OF ALL RESPONSES TO PHQ9 QUESTIONS 1 AND 2: 0
2. FEELING DOWN, DEPRESSED OR HOPELESS: NOT AT ALL
CLINICAL INTERPRETATION OF PHQ9 SCORE: NO FURTHER SCREENING NEEDED
1. LITTLE INTEREST OR PLEASURE IN DOING THINGS: NOT AT ALL

## 2020-11-17 ASSESSMENT — ENCOUNTER SYMPTOMS
RESPIRATORY NEGATIVE: 1
ALLERGIC/IMMUNOLOGIC NEGATIVE: 1
GASTROINTESTINAL NEGATIVE: 1
EYES NEGATIVE: 1
CONSTITUTIONAL NEGATIVE: 1
NEUROLOGICAL NEGATIVE: 1

## 2020-11-25 ENCOUNTER — TELEPHONE (OUTPATIENT)
Dept: FAMILY MEDICINE | Age: 60
End: 2020-11-25

## 2020-11-25 DIAGNOSIS — F90.9 ADULT ADHD: ICD-10-CM

## 2020-11-25 RX ORDER — DEXTROAMPHETAMINE SACCHARATE, AMPHETAMINE ASPARTATE, DEXTROAMPHETAMINE SULFATE AND AMPHETAMINE SULFATE 2.5; 2.5; 2.5; 2.5 MG/1; MG/1; MG/1; MG/1
TABLET ORAL
Qty: 30 TABLET | Refills: 0 | Status: SHIPPED | OUTPATIENT
Start: 2020-11-25 | End: 2020-11-27 | Stop reason: SDUPTHER

## 2020-11-25 RX ORDER — DEXTROAMPHETAMINE SACCHARATE, AMPHETAMINE ASPARTATE MONOHYDRATE, DEXTROAMPHETAMINE SULFATE AND AMPHETAMINE SULFATE 2.5; 2.5; 2.5; 2.5 MG/1; MG/1; MG/1; MG/1
10 CAPSULE, EXTENDED RELEASE ORAL DAILY
Qty: 30 CAPSULE | Refills: 0 | Status: SHIPPED | OUTPATIENT
Start: 2020-11-25 | End: 2020-11-27 | Stop reason: SDUPTHER

## 2020-11-27 RX ORDER — DEXTROAMPHETAMINE SACCHARATE, AMPHETAMINE ASPARTATE MONOHYDRATE, DEXTROAMPHETAMINE SULFATE AND AMPHETAMINE SULFATE 2.5; 2.5; 2.5; 2.5 MG/1; MG/1; MG/1; MG/1
10 CAPSULE, EXTENDED RELEASE ORAL DAILY
Qty: 30 CAPSULE | Refills: 0 | Status: SHIPPED | OUTPATIENT
Start: 2020-11-27 | End: 2020-12-22 | Stop reason: SDUPTHER

## 2020-11-27 RX ORDER — DEXTROAMPHETAMINE SACCHARATE, AMPHETAMINE ASPARTATE, DEXTROAMPHETAMINE SULFATE AND AMPHETAMINE SULFATE 2.5; 2.5; 2.5; 2.5 MG/1; MG/1; MG/1; MG/1
TABLET ORAL
Qty: 30 TABLET | Refills: 0 | Status: SHIPPED | OUTPATIENT
Start: 2020-11-27 | End: 2020-12-22 | Stop reason: SDUPTHER

## 2020-12-22 DIAGNOSIS — F90.9 ADULT ADHD: ICD-10-CM

## 2020-12-22 RX ORDER — DEXTROAMPHETAMINE SACCHARATE, AMPHETAMINE ASPARTATE, DEXTROAMPHETAMINE SULFATE AND AMPHETAMINE SULFATE 2.5; 2.5; 2.5; 2.5 MG/1; MG/1; MG/1; MG/1
TABLET ORAL
Qty: 30 TABLET | Refills: 0 | Status: SHIPPED | OUTPATIENT
Start: 2020-12-22 | End: 2021-01-25 | Stop reason: SDUPTHER

## 2020-12-22 RX ORDER — DEXTROAMPHETAMINE SACCHARATE, AMPHETAMINE ASPARTATE MONOHYDRATE, DEXTROAMPHETAMINE SULFATE AND AMPHETAMINE SULFATE 2.5; 2.5; 2.5; 2.5 MG/1; MG/1; MG/1; MG/1
10 CAPSULE, EXTENDED RELEASE ORAL DAILY
Qty: 30 CAPSULE | Refills: 0 | Status: SHIPPED | OUTPATIENT
Start: 2020-12-22 | End: 2021-01-25 | Stop reason: SDUPTHER

## 2021-01-25 DIAGNOSIS — F90.9 ADULT ADHD: ICD-10-CM

## 2021-01-26 RX ORDER — DEXTROAMPHETAMINE SACCHARATE, AMPHETAMINE ASPARTATE, DEXTROAMPHETAMINE SULFATE AND AMPHETAMINE SULFATE 2.5; 2.5; 2.5; 2.5 MG/1; MG/1; MG/1; MG/1
TABLET ORAL
Qty: 30 TABLET | Refills: 0 | Status: SHIPPED | OUTPATIENT
Start: 2021-01-26 | End: 2021-03-06 | Stop reason: SDUPTHER

## 2021-01-26 RX ORDER — DEXTROAMPHETAMINE SACCHARATE, AMPHETAMINE ASPARTATE MONOHYDRATE, DEXTROAMPHETAMINE SULFATE AND AMPHETAMINE SULFATE 2.5; 2.5; 2.5; 2.5 MG/1; MG/1; MG/1; MG/1
10 CAPSULE, EXTENDED RELEASE ORAL DAILY
Qty: 30 CAPSULE | Refills: 0 | Status: SHIPPED | OUTPATIENT
Start: 2021-01-26 | End: 2021-03-06 | Stop reason: SDUPTHER

## 2021-03-06 DIAGNOSIS — F90.9 ADULT ADHD: ICD-10-CM

## 2021-03-08 RX ORDER — DEXTROAMPHETAMINE SACCHARATE, AMPHETAMINE ASPARTATE MONOHYDRATE, DEXTROAMPHETAMINE SULFATE AND AMPHETAMINE SULFATE 2.5; 2.5; 2.5; 2.5 MG/1; MG/1; MG/1; MG/1
10 CAPSULE, EXTENDED RELEASE ORAL DAILY
Qty: 30 CAPSULE | Refills: 0 | Status: SHIPPED | OUTPATIENT
Start: 2021-03-08 | End: 2021-03-31 | Stop reason: SDUPTHER

## 2021-03-08 RX ORDER — DEXTROAMPHETAMINE SACCHARATE, AMPHETAMINE ASPARTATE, DEXTROAMPHETAMINE SULFATE AND AMPHETAMINE SULFATE 2.5; 2.5; 2.5; 2.5 MG/1; MG/1; MG/1; MG/1
TABLET ORAL
Qty: 30 TABLET | Refills: 0 | Status: SHIPPED | OUTPATIENT
Start: 2021-03-08 | End: 2021-03-31 | Stop reason: SDUPTHER

## 2021-03-23 ENCOUNTER — OFFICE VISIT (OUTPATIENT)
Dept: FAMILY MEDICINE | Age: 61
End: 2021-03-23

## 2021-03-23 VITALS
TEMPERATURE: 97.3 F | BODY MASS INDEX: 38.79 KG/M2 | RESPIRATION RATE: 20 BRPM | SYSTOLIC BLOOD PRESSURE: 118 MMHG | WEIGHT: 286 LBS | DIASTOLIC BLOOD PRESSURE: 78 MMHG | OXYGEN SATURATION: 97 % | HEART RATE: 75 BPM

## 2021-03-23 DIAGNOSIS — G47.09 OTHER INSOMNIA: ICD-10-CM

## 2021-03-23 DIAGNOSIS — F90.9 ADULT ADHD: Primary | ICD-10-CM

## 2021-03-23 DIAGNOSIS — I10 ESSENTIAL HYPERTENSION: ICD-10-CM

## 2021-03-23 DIAGNOSIS — Z12.5 SCREENING FOR MALIGNANT NEOPLASM OF PROSTATE: ICD-10-CM

## 2021-03-23 DIAGNOSIS — E78.5 HYPERLIPIDEMIA, UNSPECIFIED HYPERLIPIDEMIA TYPE: ICD-10-CM

## 2021-03-23 PROCEDURE — 3078F DIAST BP <80 MM HG: CPT | Performed by: NURSE PRACTITIONER

## 2021-03-23 PROCEDURE — 3074F SYST BP LT 130 MM HG: CPT | Performed by: NURSE PRACTITIONER

## 2021-03-23 PROCEDURE — 99214 OFFICE O/P EST MOD 30 MIN: CPT | Performed by: NURSE PRACTITIONER

## 2021-03-23 ASSESSMENT — PATIENT HEALTH QUESTIONNAIRE - PHQ9
CLINICAL INTERPRETATION OF PHQ9 SCORE: NO FURTHER SCREENING NEEDED
2. FEELING DOWN, DEPRESSED OR HOPELESS: SEVERAL DAYS
1. LITTLE INTEREST OR PLEASURE IN DOING THINGS: SEVERAL DAYS
SUM OF ALL RESPONSES TO PHQ9 QUESTIONS 1 AND 2: 2
SUM OF ALL RESPONSES TO PHQ9 QUESTIONS 1 AND 2: 2
CLINICAL INTERPRETATION OF PHQ2 SCORE: NO FURTHER SCREENING NEEDED

## 2021-03-23 ASSESSMENT — ENCOUNTER SYMPTOMS
ALLERGIC/IMMUNOLOGIC NEGATIVE: 1
CONSTITUTIONAL NEGATIVE: 1
NEUROLOGICAL NEGATIVE: 1
RESPIRATORY NEGATIVE: 1
ENDOCRINE NEGATIVE: 1
HEMATOLOGIC/LYMPHATIC NEGATIVE: 1
EYES NEGATIVE: 1
GASTROINTESTINAL NEGATIVE: 1
SLEEP DISTURBANCE: 1

## 2021-03-31 DIAGNOSIS — F90.9 ADULT ADHD: ICD-10-CM

## 2021-04-01 RX ORDER — DEXTROAMPHETAMINE SACCHARATE, AMPHETAMINE ASPARTATE, DEXTROAMPHETAMINE SULFATE AND AMPHETAMINE SULFATE 2.5; 2.5; 2.5; 2.5 MG/1; MG/1; MG/1; MG/1
TABLET ORAL
Qty: 30 TABLET | Refills: 0 | Status: SHIPPED | OUTPATIENT
Start: 2021-04-01 | End: 2021-04-27 | Stop reason: SDUPTHER

## 2021-04-01 RX ORDER — DEXTROAMPHETAMINE SACCHARATE, AMPHETAMINE ASPARTATE MONOHYDRATE, DEXTROAMPHETAMINE SULFATE AND AMPHETAMINE SULFATE 2.5; 2.5; 2.5; 2.5 MG/1; MG/1; MG/1; MG/1
10 CAPSULE, EXTENDED RELEASE ORAL DAILY
Qty: 30 CAPSULE | Refills: 0 | Status: SHIPPED | OUTPATIENT
Start: 2021-04-01 | End: 2021-04-27 | Stop reason: SDUPTHER

## 2021-04-05 DIAGNOSIS — F90.9 ADULT ADHD: ICD-10-CM

## 2021-04-05 RX ORDER — DEXTROAMPHETAMINE SACCHARATE, AMPHETAMINE ASPARTATE MONOHYDRATE, DEXTROAMPHETAMINE SULFATE AND AMPHETAMINE SULFATE 2.5; 2.5; 2.5; 2.5 MG/1; MG/1; MG/1; MG/1
10 CAPSULE, EXTENDED RELEASE ORAL DAILY
Qty: 30 CAPSULE | Refills: 0 | OUTPATIENT
Start: 2021-04-05

## 2021-04-05 RX ORDER — DEXTROAMPHETAMINE SACCHARATE, AMPHETAMINE ASPARTATE, DEXTROAMPHETAMINE SULFATE AND AMPHETAMINE SULFATE 2.5; 2.5; 2.5; 2.5 MG/1; MG/1; MG/1; MG/1
TABLET ORAL
Qty: 30 TABLET | Refills: 0 | OUTPATIENT
Start: 2021-04-05

## 2021-04-08 ENCOUNTER — E-ADVICE (OUTPATIENT)
Dept: FAMILY MEDICINE | Age: 61
End: 2021-04-08

## 2021-04-27 DIAGNOSIS — F90.9 ADULT ADHD: ICD-10-CM

## 2021-04-27 RX ORDER — DEXTROAMPHETAMINE SACCHARATE, AMPHETAMINE ASPARTATE MONOHYDRATE, DEXTROAMPHETAMINE SULFATE AND AMPHETAMINE SULFATE 2.5; 2.5; 2.5; 2.5 MG/1; MG/1; MG/1; MG/1
10 CAPSULE, EXTENDED RELEASE ORAL DAILY
Qty: 30 CAPSULE | Refills: 0 | Status: SHIPPED | OUTPATIENT
Start: 2021-04-27 | End: 2021-06-11 | Stop reason: SDUPTHER

## 2021-04-27 RX ORDER — DEXTROAMPHETAMINE SACCHARATE, AMPHETAMINE ASPARTATE, DEXTROAMPHETAMINE SULFATE AND AMPHETAMINE SULFATE 2.5; 2.5; 2.5; 2.5 MG/1; MG/1; MG/1; MG/1
TABLET ORAL
Qty: 30 TABLET | Refills: 0 | Status: SHIPPED | OUTPATIENT
Start: 2021-04-27 | End: 2021-06-11 | Stop reason: SDUPTHER

## 2021-05-25 VITALS
DIASTOLIC BLOOD PRESSURE: 78 MMHG | RESPIRATION RATE: 16 BRPM | SYSTOLIC BLOOD PRESSURE: 130 MMHG | HEART RATE: 60 BPM | TEMPERATURE: 98.3 F

## 2021-06-11 DIAGNOSIS — F90.9 ADULT ADHD: ICD-10-CM

## 2021-06-11 RX ORDER — DEXTROAMPHETAMINE SACCHARATE, AMPHETAMINE ASPARTATE MONOHYDRATE, DEXTROAMPHETAMINE SULFATE AND AMPHETAMINE SULFATE 2.5; 2.5; 2.5; 2.5 MG/1; MG/1; MG/1; MG/1
10 CAPSULE, EXTENDED RELEASE ORAL DAILY
Qty: 30 CAPSULE | Refills: 0 | Status: SHIPPED | OUTPATIENT
Start: 2021-06-11 | End: 2021-07-13 | Stop reason: SDUPTHER

## 2021-06-11 RX ORDER — DEXTROAMPHETAMINE SACCHARATE, AMPHETAMINE ASPARTATE, DEXTROAMPHETAMINE SULFATE AND AMPHETAMINE SULFATE 2.5; 2.5; 2.5; 2.5 MG/1; MG/1; MG/1; MG/1
TABLET ORAL
Qty: 30 TABLET | Refills: 0 | Status: SHIPPED | OUTPATIENT
Start: 2021-06-11 | End: 2021-07-13 | Stop reason: SDUPTHER

## 2021-07-13 DIAGNOSIS — F90.9 ADULT ADHD: ICD-10-CM

## 2021-07-14 RX ORDER — DEXTROAMPHETAMINE SACCHARATE, AMPHETAMINE ASPARTATE MONOHYDRATE, DEXTROAMPHETAMINE SULFATE AND AMPHETAMINE SULFATE 2.5; 2.5; 2.5; 2.5 MG/1; MG/1; MG/1; MG/1
10 CAPSULE, EXTENDED RELEASE ORAL DAILY
Qty: 30 CAPSULE | Refills: 0 | Status: SHIPPED | OUTPATIENT
Start: 2021-07-14 | End: 2021-08-23 | Stop reason: SDUPTHER

## 2021-07-14 RX ORDER — DEXTROAMPHETAMINE SACCHARATE, AMPHETAMINE ASPARTATE, DEXTROAMPHETAMINE SULFATE AND AMPHETAMINE SULFATE 2.5; 2.5; 2.5; 2.5 MG/1; MG/1; MG/1; MG/1
TABLET ORAL
Qty: 30 TABLET | Refills: 0 | Status: SHIPPED | OUTPATIENT
Start: 2021-07-14 | End: 2021-08-23 | Stop reason: SDUPTHER

## 2021-07-14 RX ORDER — DEXTROAMPHETAMINE SACCHARATE, AMPHETAMINE ASPARTATE, DEXTROAMPHETAMINE SULFATE AND AMPHETAMINE SULFATE 2.5; 2.5; 2.5; 2.5 MG/1; MG/1; MG/1; MG/1
TABLET ORAL
Qty: 30 TABLET | Refills: 0 | Status: SHIPPED | OUTPATIENT
Start: 2021-07-14 | End: 2021-07-14 | Stop reason: SDUPTHER

## 2021-07-14 RX ORDER — DEXTROAMPHETAMINE SACCHARATE, AMPHETAMINE ASPARTATE MONOHYDRATE, DEXTROAMPHETAMINE SULFATE AND AMPHETAMINE SULFATE 2.5; 2.5; 2.5; 2.5 MG/1; MG/1; MG/1; MG/1
10 CAPSULE, EXTENDED RELEASE ORAL DAILY
Qty: 30 CAPSULE | Refills: 0 | Status: SHIPPED | OUTPATIENT
Start: 2021-07-14 | End: 2021-07-14 | Stop reason: SDUPTHER

## 2021-07-14 RX ORDER — DEXTROAMPHETAMINE SACCHARATE, AMPHETAMINE ASPARTATE MONOHYDRATE, DEXTROAMPHETAMINE SULFATE AND AMPHETAMINE SULFATE 2.5; 2.5; 2.5; 2.5 MG/1; MG/1; MG/1; MG/1
10 CAPSULE, EXTENDED RELEASE ORAL DAILY
Qty: 30 CAPSULE | Refills: 0 | Status: SHIPPED | OUTPATIENT
Start: 2021-07-14 | End: 2021-08-18 | Stop reason: SDUPTHER

## 2021-07-14 RX ORDER — DEXTROAMPHETAMINE SACCHARATE, AMPHETAMINE ASPARTATE, DEXTROAMPHETAMINE SULFATE AND AMPHETAMINE SULFATE 2.5; 2.5; 2.5; 2.5 MG/1; MG/1; MG/1; MG/1
TABLET ORAL
Qty: 30 TABLET | Refills: 0 | Status: SHIPPED | OUTPATIENT
Start: 2021-07-14 | End: 2021-08-18 | Stop reason: SDUPTHER

## 2021-07-31 RX ORDER — OMEPRAZOLE 40 MG/1
40 CAPSULE, DELAYED RELEASE ORAL DAILY
Qty: 90 CAPSULE | Refills: 3 | Status: SHIPPED | OUTPATIENT
Start: 2021-07-31 | End: 2022-07-26

## 2021-08-18 DIAGNOSIS — F90.9 ADULT ADHD: ICD-10-CM

## 2021-08-20 RX ORDER — DEXTROAMPHETAMINE SACCHARATE, AMPHETAMINE ASPARTATE MONOHYDRATE, DEXTROAMPHETAMINE SULFATE AND AMPHETAMINE SULFATE 2.5; 2.5; 2.5; 2.5 MG/1; MG/1; MG/1; MG/1
10 CAPSULE, EXTENDED RELEASE ORAL DAILY
Qty: 30 CAPSULE | Refills: 0 | Status: SHIPPED | OUTPATIENT
Start: 2021-08-20 | End: 2021-08-23 | Stop reason: ALTCHOICE

## 2021-08-20 RX ORDER — DEXTROAMPHETAMINE SACCHARATE, AMPHETAMINE ASPARTATE, DEXTROAMPHETAMINE SULFATE AND AMPHETAMINE SULFATE 2.5; 2.5; 2.5; 2.5 MG/1; MG/1; MG/1; MG/1
TABLET ORAL
Qty: 30 TABLET | Refills: 0 | Status: SHIPPED | OUTPATIENT
Start: 2021-08-20 | End: 2021-08-23 | Stop reason: ALTCHOICE

## 2021-08-23 ENCOUNTER — OFFICE VISIT (OUTPATIENT)
Dept: FAMILY MEDICINE | Age: 61
End: 2021-08-23

## 2021-08-23 VITALS
HEART RATE: 73 BPM | TEMPERATURE: 97.1 F | WEIGHT: 273 LBS | OXYGEN SATURATION: 94 % | SYSTOLIC BLOOD PRESSURE: 130 MMHG | BODY MASS INDEX: 37.03 KG/M2 | DIASTOLIC BLOOD PRESSURE: 72 MMHG | RESPIRATION RATE: 20 BRPM

## 2021-08-23 DIAGNOSIS — F90.9 ADULT ADHD: ICD-10-CM

## 2021-08-23 DIAGNOSIS — K21.00 GASTROESOPHAGEAL REFLUX DISEASE WITH ESOPHAGITIS WITHOUT HEMORRHAGE: ICD-10-CM

## 2021-08-23 DIAGNOSIS — I10 ESSENTIAL HYPERTENSION: Primary | ICD-10-CM

## 2021-08-23 PROCEDURE — 99214 OFFICE O/P EST MOD 30 MIN: CPT | Performed by: NURSE PRACTITIONER

## 2021-08-23 PROCEDURE — 3075F SYST BP GE 130 - 139MM HG: CPT | Performed by: NURSE PRACTITIONER

## 2021-08-23 PROCEDURE — 3078F DIAST BP <80 MM HG: CPT | Performed by: NURSE PRACTITIONER

## 2021-08-23 RX ORDER — VALSARTAN AND HYDROCHLOROTHIAZIDE 80; 12.5 MG/1; MG/1
1 TABLET, FILM COATED ORAL DAILY
Qty: 90 TABLET | Refills: 3 | Status: SHIPPED | OUTPATIENT
Start: 2021-08-23 | End: 2022-04-07 | Stop reason: SDUPTHER

## 2021-08-23 SDOH — HEALTH STABILITY: PHYSICAL HEALTH: ON AVERAGE, HOW MANY DAYS PER WEEK DO YOU ENGAGE IN MODERATE TO STRENUOUS EXERCISE (LIKE A BRISK WALK)?: 6 DAYS

## 2021-08-23 SDOH — HEALTH STABILITY: PHYSICAL HEALTH: ON AVERAGE, HOW MANY MINUTES DO YOU ENGAGE IN EXERCISE AT THIS LEVEL?: 60 MIN

## 2021-08-23 ASSESSMENT — ANXIETY QUESTIONNAIRES
1. FEELING NERVOUS, ANXIOUS, OR ON EDGE: 0
GAD7 TOTAL SCORE: 8
3. WORRYING TOO MUCH ABOUT DIFFERENT THINGS: NEARLY EVERY DAY
5. BEING SO RESTLESS THAT IT IS HARD TO SIT STILL: NOT AT ALL
4. TROUBLE RELAXING: 0
2. NOT BEING ABLE TO STOP OR CONTROL WORRYING: NEARLY EVERY DAY
2. NOT BEING ABLE TO STOP OR CONTROL WORRYING: 3
7. FEELING AFRAID AS IF SOMETHING AWFUL MIGHT HAPPEN: MORE THAN HALF THE DAYS
6. BECOMING EASILY ANNOYED OR IRRITABLE: NOT AT ALL
1. FEELING NERVOUS, ANXIOUS, OR ON EDGE: NOT AT ALL
IF YOU CHECKED OFF ANY PROBLEMS ON THIS QUESTIONNAIRE, HOW DIFFICULT HAVE THESE PROBLEMS MADE IT FOR YOU TO DO YOUR WORK, TAKE CARE OF THINGS AT HOME, OR GET ALONG WITH OTHER PEOPLE: SOMEWHAT DIFFICULT
7. FEELING AFRAID AS IF SOMETHING AWFUL MIGHT HAPPEN: 2
5. BEING SO RESTLESS THAT IT IS HARD TO SIT STILL: 0
6. BECOMING EASILY ANNOYED OR IRRITABLE: 0
3. WORRYING TOO MUCH ABOUT DIFFERENT THINGS: 3
4. TROUBLE RELAXING: NOT AT ALL

## 2021-08-23 ASSESSMENT — PATIENT HEALTH QUESTIONNAIRE - PHQ9
SUM OF ALL RESPONSES TO PHQ9 QUESTIONS 1 AND 2: 1
SUM OF ALL RESPONSES TO PHQ9 QUESTIONS 1 AND 2: 0
1. LITTLE INTEREST OR PLEASURE IN DOING THINGS: NOT AT ALL
CLINICAL INTERPRETATION OF PHQ9 SCORE: NO FURTHER SCREENING NEEDED
CLINICAL INTERPRETATION OF PHQ2 SCORE: NO FURTHER SCREENING NEEDED
CLINICAL INTERPRETATION OF PHQ2 SCORE: NO FURTHER SCREENING NEEDED
2. FEELING DOWN, DEPRESSED OR HOPELESS: NOT AT ALL
1. LITTLE INTEREST OR PLEASURE IN DOING THINGS: NOT AT ALL
SUM OF ALL RESPONSES TO PHQ9 QUESTIONS 1 AND 2: 1
2. FEELING DOWN, DEPRESSED OR HOPELESS: SEVERAL DAYS

## 2021-08-27 ASSESSMENT — ENCOUNTER SYMPTOMS
CONSTITUTIONAL NEGATIVE: 1
HEMATOLOGIC/LYMPHATIC NEGATIVE: 1
RESPIRATORY NEGATIVE: 1
NEUROLOGICAL NEGATIVE: 1

## 2021-10-26 DIAGNOSIS — F90.9 ADULT ADHD: ICD-10-CM

## 2021-10-27 RX ORDER — DEXTROAMPHETAMINE SACCHARATE, AMPHETAMINE ASPARTATE, DEXTROAMPHETAMINE SULFATE AND AMPHETAMINE SULFATE 2.5; 2.5; 2.5; 2.5 MG/1; MG/1; MG/1; MG/1
TABLET ORAL
Qty: 30 TABLET | Refills: 0 | Status: SHIPPED | OUTPATIENT
Start: 2021-10-27 | End: 2021-11-30 | Stop reason: SDUPTHER

## 2021-10-27 RX ORDER — DEXTROAMPHETAMINE SACCHARATE, AMPHETAMINE ASPARTATE MONOHYDRATE, DEXTROAMPHETAMINE SULFATE AND AMPHETAMINE SULFATE 2.5; 2.5; 2.5; 2.5 MG/1; MG/1; MG/1; MG/1
10 CAPSULE, EXTENDED RELEASE ORAL DAILY
Qty: 30 CAPSULE | Refills: 0 | Status: SHIPPED | OUTPATIENT
Start: 2021-10-27 | End: 2021-11-30 | Stop reason: SDUPTHER

## 2021-11-30 DIAGNOSIS — F90.9 ADULT ADHD: ICD-10-CM

## 2021-11-30 RX ORDER — DEXTROAMPHETAMINE SACCHARATE, AMPHETAMINE ASPARTATE MONOHYDRATE, DEXTROAMPHETAMINE SULFATE AND AMPHETAMINE SULFATE 2.5; 2.5; 2.5; 2.5 MG/1; MG/1; MG/1; MG/1
10 CAPSULE, EXTENDED RELEASE ORAL DAILY
Qty: 30 CAPSULE | Refills: 0 | Status: SHIPPED | OUTPATIENT
Start: 2021-11-30 | End: 2021-12-29 | Stop reason: SDUPTHER

## 2021-11-30 RX ORDER — DEXTROAMPHETAMINE SACCHARATE, AMPHETAMINE ASPARTATE, DEXTROAMPHETAMINE SULFATE AND AMPHETAMINE SULFATE 2.5; 2.5; 2.5; 2.5 MG/1; MG/1; MG/1; MG/1
TABLET ORAL
Qty: 30 TABLET | Refills: 0 | Status: SHIPPED | OUTPATIENT
Start: 2021-11-30 | End: 2021-12-29 | Stop reason: SDUPTHER

## 2021-12-28 ENCOUNTER — TELEPHONE (OUTPATIENT)
Dept: FAMILY MEDICINE | Age: 61
End: 2021-12-28

## 2021-12-29 DIAGNOSIS — F90.9 ADULT ADHD: ICD-10-CM

## 2021-12-29 RX ORDER — DEXTROAMPHETAMINE SACCHARATE, AMPHETAMINE ASPARTATE MONOHYDRATE, DEXTROAMPHETAMINE SULFATE AND AMPHETAMINE SULFATE 2.5; 2.5; 2.5; 2.5 MG/1; MG/1; MG/1; MG/1
10 CAPSULE, EXTENDED RELEASE ORAL DAILY
Qty: 30 CAPSULE | Refills: 0 | Status: SHIPPED | OUTPATIENT
Start: 2021-12-29 | End: 2022-01-26 | Stop reason: SDUPTHER

## 2021-12-29 RX ORDER — DEXTROAMPHETAMINE SACCHARATE, AMPHETAMINE ASPARTATE, DEXTROAMPHETAMINE SULFATE AND AMPHETAMINE SULFATE 2.5; 2.5; 2.5; 2.5 MG/1; MG/1; MG/1; MG/1
TABLET ORAL
Qty: 30 TABLET | Refills: 0 | Status: SHIPPED | OUTPATIENT
Start: 2021-12-29 | End: 2022-01-26 | Stop reason: SDUPTHER

## 2022-01-26 DIAGNOSIS — F90.9 ADULT ADHD: ICD-10-CM

## 2022-01-27 RX ORDER — DEXTROAMPHETAMINE SACCHARATE, AMPHETAMINE ASPARTATE MONOHYDRATE, DEXTROAMPHETAMINE SULFATE AND AMPHETAMINE SULFATE 2.5; 2.5; 2.5; 2.5 MG/1; MG/1; MG/1; MG/1
10 CAPSULE, EXTENDED RELEASE ORAL DAILY
Qty: 30 CAPSULE | Refills: 0 | Status: SHIPPED | OUTPATIENT
Start: 2022-01-27 | End: 2022-03-07 | Stop reason: SDUPTHER

## 2022-01-27 RX ORDER — DEXTROAMPHETAMINE SACCHARATE, AMPHETAMINE ASPARTATE, DEXTROAMPHETAMINE SULFATE AND AMPHETAMINE SULFATE 2.5; 2.5; 2.5; 2.5 MG/1; MG/1; MG/1; MG/1
TABLET ORAL
Qty: 30 TABLET | Refills: 0 | Status: SHIPPED | OUTPATIENT
Start: 2022-01-27 | End: 2022-03-07 | Stop reason: SDUPTHER

## 2022-01-31 ENCOUNTER — LAB SERVICES (OUTPATIENT)
Dept: LAB | Age: 62
End: 2022-01-31

## 2022-01-31 DIAGNOSIS — I10 ESSENTIAL HYPERTENSION: ICD-10-CM

## 2022-01-31 LAB
ALBUMIN SERPL-MCNC: 4.1 G/DL (ref 3.6–5.1)
ALP SERPL-CCNC: 71 U/L (ref 45–115)
ALT SERPL W/O P-5'-P-CCNC: 31 U/L (ref 5–49)
AST SERPL-CCNC: 31 U/L (ref 14–43)
BILIRUB SERPL-MCNC: 0.6 MG/DL (ref 0–1.3)
BUN SERPL-MCNC: 19 MG/DL (ref 6–27)
CALCIUM SERPL-MCNC: 9.7 MG/DL (ref 8.6–10.6)
CHLORIDE SERPL-SCNC: 105 MMOL/L (ref 96–107)
CHOLEST SERPL-MCNC: 198 MG/DL (ref 140–200)
CO2 SERPL-SCNC: 29 MMOL/L (ref 22–32)
CREAT SERPL-MCNC: 1.3 MG/DL (ref 0.6–1.6)
GFR SERPL CREATININE-BSD FRML MDRD: 56 ML/MIN/{1.73M2}
GFR SERPL CREATININE-BSD FRML MDRD: >60 ML/MIN/{1.73M2}
GLUCOSE P FAST SERPL-MCNC: 107 MG/DL (ref 60–100)
HDLC SERPL-MCNC: 62 MG/DL
LDLC SERPL CALC-MCNC: 120 MG/DL (ref 30–100)
POTASSIUM SERPL-SCNC: 4.5 MMOL/L (ref 3.5–5.3)
PROT SERPL-MCNC: 6.8 G/DL (ref 6.4–8.5)
SODIUM SERPL-SCNC: 138 MMOL/L (ref 136–146)
TESTOST SERPL-MCNC: 541 NG/DL (ref 200–813)
TRIGL SERPL-MCNC: 79 MG/DL (ref 0–200)
TSH SERPL DL<=0.05 MIU/L-ACNC: 1.94 M[IU]/L (ref 0.3–4.82)

## 2022-01-31 PROCEDURE — 84443 ASSAY THYROID STIM HORMONE: CPT | Performed by: NURSE PRACTITIONER

## 2022-01-31 PROCEDURE — 84403 ASSAY OF TOTAL TESTOSTERONE: CPT | Performed by: NURSE PRACTITIONER

## 2022-01-31 PROCEDURE — 80061 LIPID PANEL: CPT | Performed by: NURSE PRACTITIONER

## 2022-01-31 PROCEDURE — 80053 COMPREHEN METABOLIC PANEL: CPT | Performed by: NURSE PRACTITIONER

## 2022-01-31 PROCEDURE — 36415 COLL VENOUS BLD VENIPUNCTURE: CPT | Performed by: NURSE PRACTITIONER

## 2022-02-07 ENCOUNTER — OFFICE VISIT (OUTPATIENT)
Dept: INTERNAL MEDICINE | Age: 62
End: 2022-02-07

## 2022-02-07 ENCOUNTER — APPOINTMENT (OUTPATIENT)
Dept: FAMILY MEDICINE | Age: 62
End: 2022-02-07

## 2022-02-07 VITALS
BODY MASS INDEX: 41.05 KG/M2 | HEIGHT: 71 IN | WEIGHT: 293.2 LBS | DIASTOLIC BLOOD PRESSURE: 86 MMHG | RESPIRATION RATE: 18 BRPM | HEART RATE: 68 BPM | TEMPERATURE: 96.7 F | SYSTOLIC BLOOD PRESSURE: 142 MMHG

## 2022-02-07 DIAGNOSIS — F41.8 DEPRESSION WITH ANXIETY: ICD-10-CM

## 2022-02-07 DIAGNOSIS — G47.33 OBSTRUCTIVE SLEEP APNEA: ICD-10-CM

## 2022-02-07 DIAGNOSIS — Z00.00 HEALTHCARE MAINTENANCE: Primary | ICD-10-CM

## 2022-02-07 DIAGNOSIS — R73.03 PREDIABETES: ICD-10-CM

## 2022-02-07 DIAGNOSIS — F90.9 ADULT ADHD: ICD-10-CM

## 2022-02-07 DIAGNOSIS — E66.01 CLASS 3 SEVERE OBESITY DUE TO EXCESS CALORIES WITH SERIOUS COMORBIDITY AND BODY MASS INDEX (BMI) OF 40.0 TO 44.9 IN ADULT (CMD): ICD-10-CM

## 2022-02-07 DIAGNOSIS — R94.4 DECREASED GFR: ICD-10-CM

## 2022-02-07 DIAGNOSIS — I10 ESSENTIAL HYPERTENSION: ICD-10-CM

## 2022-02-07 PROCEDURE — 99203 OFFICE O/P NEW LOW 30 MIN: CPT | Performed by: INTERNAL MEDICINE

## 2022-02-07 PROCEDURE — 99386 PREV VISIT NEW AGE 40-64: CPT | Performed by: INTERNAL MEDICINE

## 2022-02-07 PROCEDURE — 3077F SYST BP >= 140 MM HG: CPT | Performed by: INTERNAL MEDICINE

## 2022-02-07 PROCEDURE — 96127 BRIEF EMOTIONAL/BEHAV ASSMT: CPT | Performed by: INTERNAL MEDICINE

## 2022-02-07 PROCEDURE — 3079F DIAST BP 80-89 MM HG: CPT | Performed by: INTERNAL MEDICINE

## 2022-02-07 ASSESSMENT — PATIENT HEALTH QUESTIONNAIRE - PHQ9
SUM OF ALL RESPONSES TO PHQ9 QUESTIONS 1 AND 2: 3
2. FEELING DOWN, DEPRESSED OR HOPELESS: SEVERAL DAYS
CLINICAL INTERPRETATION OF PHQ2 SCORE: FURTHER SCREENING NEEDED
4. FEELING TIRED OR HAVING LITTLE ENERGY: NOT AT ALL
1. LITTLE INTEREST OR PLEASURE IN DOING THINGS: MORE THAN HALF THE DAYS
3. TROUBLE FALLING OR STAYING ASLEEP OR SLEEPING TOO MUCH: MORE THAN HALF THE DAYS
SUM OF ALL RESPONSES TO PHQ9 QUESTIONS 1 AND 2: 3
9. THOUGHTS THAT YOU WOULD BE BETTER OFF DEAD, OR OF HURTING YOURSELF: NOT AT ALL
CLINICAL INTERPRETATION OF PHQ9 SCORE: MODERATE DEPRESSION
7. TROUBLE CONCENTRATING ON THINGS, SUCH AS READING THE NEWSPAPER OR WATCHING TELEVISION: NOT AT ALL
5. POOR APPETITE, WEIGHT LOSS, OR OVEREATING: NEARLY EVERY DAY
SUM OF ALL RESPONSES TO PHQ QUESTIONS 1-9: 14
8. MOVING OR SPEAKING SO SLOWLY THAT OTHER PEOPLE COULD HAVE NOTICED. OR THE OPPOSITE, BEING SO FIGETY OR RESTLESS THAT YOU HAVE BEEN MOVING AROUND A LOT MORE THAN USUAL: NEARLY EVERY DAY
6. FEELING BAD ABOUT YOURSELF - OR THAT YOU ARE A FAILURE OR HAVE LET YOURSELF OR YOUR FAMILY DOWN: NEARLY EVERY DAY

## 2022-02-08 ASSESSMENT — ANXIETY QUESTIONNAIRES
1. FEELING NERVOUS, ANXIOUS, OR ON EDGE: 3
1. FEELING NERVOUS, ANXIOUS, OR ON EDGE: NEARLY EVERY DAY
3. WORRYING TOO MUCH ABOUT DIFFERENT THINGS: 3
2. NOT BEING ABLE TO STOP OR CONTROL WORRYING: 3
6. BECOMING EASILY ANNOYED OR IRRITABLE: 2
7. FEELING AFRAID AS IF SOMETHING AWFUL MIGHT HAPPEN: 2
6. BECOMING EASILY ANNOYED OR IRRITABLE: MORE THAN HALF THE DAYS
4. TROUBLE RELAXING: 2
4. TROUBLE RELAXING: MORE THAN HALF THE DAYS
2. NOT BEING ABLE TO STOP OR CONTROL WORRYING: NEARLY EVERY DAY
GAD7 TOTAL SCORE: 18
7. FEELING AFRAID AS IF SOMETHING AWFUL MIGHT HAPPEN: MORE THAN HALF THE DAYS
5. BEING SO RESTLESS THAT IT IS HARD TO SIT STILL: 3
3. WORRYING TOO MUCH ABOUT DIFFERENT THINGS: NEARLY EVERY DAY
5. BEING SO RESTLESS THAT IT IS HARD TO SIT STILL: NEARLY EVERY DAY

## 2022-03-07 ENCOUNTER — TELEPHONE (OUTPATIENT)
Dept: INTERNAL MEDICINE | Age: 62
End: 2022-03-07

## 2022-03-07 DIAGNOSIS — F90.9 ADULT ADHD: ICD-10-CM

## 2022-03-07 RX ORDER — FLUTICASONE PROPIONATE 50 MCG
2 SPRAY, SUSPENSION (ML) NASAL DAILY
OUTPATIENT
Start: 2022-03-07 | End: 2022-04-06

## 2022-03-09 ENCOUNTER — APPOINTMENT (OUTPATIENT)
Dept: INTERNAL MEDICINE | Age: 62
End: 2022-03-09

## 2022-03-09 ENCOUNTER — LAB SERVICES (OUTPATIENT)
Dept: LAB | Age: 62
End: 2022-03-09

## 2022-03-09 DIAGNOSIS — Z00.00 HEALTHCARE MAINTENANCE: ICD-10-CM

## 2022-03-09 LAB
HEMATOCRIT: 46.6 % (ref 40–51)
HEMOGLOBIN: 15.1 G/DL (ref 13.7–17.5)
MEAN CORPUSCULAR HGB CONCENTRATION: 32.4 % (ref 32–36)
MEAN CORPUSCULAR HGB: 32.1 PG (ref 27–34)
MEAN CORPUSCULAR VOLUME: 98.9 FL (ref 79–95)
MEAN PLATELET VOLUME: 11.8 FL (ref 8.6–12.4)
PLATELET COUNT: 218 10*3/UL (ref 150–400)
RED BLOOD CELL COUNT: 4.71 10*6/UL (ref 3.9–5.7)
RED CELL DISTRIBUTION WIDTH: 12.7 % (ref 11.3–14.8)
WHITE BLOOD CELL COUNT: 6.1 10*3/UL (ref 4–10)

## 2022-03-09 PROCEDURE — 80048 BASIC METABOLIC PNL TOTAL CA: CPT | Performed by: INTERNAL MEDICINE

## 2022-03-09 PROCEDURE — 36415 COLL VENOUS BLD VENIPUNCTURE: CPT | Performed by: INTERNAL MEDICINE

## 2022-03-09 PROCEDURE — 83036 HEMOGLOBIN GLYCOSYLATED A1C: CPT | Performed by: INTERNAL MEDICINE

## 2022-03-09 PROCEDURE — G0103 PSA SCREENING: HCPCS | Performed by: INTERNAL MEDICINE

## 2022-03-09 PROCEDURE — 85027 COMPLETE CBC AUTOMATED: CPT | Performed by: INTERNAL MEDICINE

## 2022-03-09 PROCEDURE — 84443 ASSAY THYROID STIM HORMONE: CPT | Performed by: INTERNAL MEDICINE

## 2022-03-09 RX ORDER — DEXTROAMPHETAMINE SACCHARATE, AMPHETAMINE ASPARTATE MONOHYDRATE, DEXTROAMPHETAMINE SULFATE AND AMPHETAMINE SULFATE 2.5; 2.5; 2.5; 2.5 MG/1; MG/1; MG/1; MG/1
10 CAPSULE, EXTENDED RELEASE ORAL DAILY
Qty: 30 CAPSULE | Refills: 0 | Status: SHIPPED | OUTPATIENT
Start: 2022-03-09 | End: 2022-04-06 | Stop reason: SDUPTHER

## 2022-03-09 RX ORDER — DEXTROAMPHETAMINE SACCHARATE, AMPHETAMINE ASPARTATE, DEXTROAMPHETAMINE SULFATE AND AMPHETAMINE SULFATE 2.5; 2.5; 2.5; 2.5 MG/1; MG/1; MG/1; MG/1
TABLET ORAL
Qty: 30 TABLET | Refills: 0 | Status: SHIPPED | OUTPATIENT
Start: 2022-03-09 | End: 2022-04-06 | Stop reason: SDUPTHER

## 2022-03-10 LAB
BUN SERPL-MCNC: 17 MG/DL (ref 6–27)
CALCIUM SERPL-MCNC: 9.9 MG/DL (ref 8.6–10.6)
CHLORIDE SERPL-SCNC: 100 MMOL/L (ref 96–107)
CO2 SERPL-SCNC: 28 MMOL/L (ref 22–32)
CREAT SERPL-MCNC: 1.2 MG/DL (ref 0.6–1.6)
EST. AVERAGE GLUCOSE BLD GHB EST-MCNC: 103 MG/DL (ref 0–154)
GFR SERPL CREATININE-BSD FRML MDRD: >60 ML/MIN/{1.73M2}
GFR SERPL CREATININE-BSD FRML MDRD: >60 ML/MIN/{1.73M2}
GLUCOSE SERPL-MCNC: 71 MG/DL (ref 70–200)
HBA1C MFR BLD: 5.2 % (ref 4.2–6)
POTASSIUM SERPL-SCNC: 4.1 MMOL/L (ref 3.5–5.3)
PSA SERPL-MCNC: 0.37 NG/ML (ref 0–4.1)
SODIUM SERPL-SCNC: 137 MMOL/L (ref 136–146)
TSH SERPL DL<=0.05 MIU/L-ACNC: 1.55 M[IU]/L (ref 0.3–4.82)

## 2022-04-06 DIAGNOSIS — F90.9 ADULT ADHD: ICD-10-CM

## 2022-04-06 RX ORDER — FLUTICASONE PROPIONATE 50 MCG
2 SPRAY, SUSPENSION (ML) NASAL DAILY
OUTPATIENT
Start: 2022-04-06 | End: 2022-05-06

## 2022-04-07 ENCOUNTER — OFFICE VISIT (OUTPATIENT)
Dept: INTERNAL MEDICINE | Age: 62
End: 2022-04-07

## 2022-04-07 VITALS
OXYGEN SATURATION: 98 % | RESPIRATION RATE: 16 BRPM | TEMPERATURE: 96.9 F | WEIGHT: 289.6 LBS | DIASTOLIC BLOOD PRESSURE: 90 MMHG | HEIGHT: 71 IN | HEART RATE: 68 BPM | BODY MASS INDEX: 40.54 KG/M2 | SYSTOLIC BLOOD PRESSURE: 142 MMHG

## 2022-04-07 DIAGNOSIS — K20.90 ESOPHAGITIS: Primary | ICD-10-CM

## 2022-04-07 DIAGNOSIS — I10 ESSENTIAL HYPERTENSION: ICD-10-CM

## 2022-04-07 DIAGNOSIS — E66.01 CLASS 3 SEVERE OBESITY DUE TO EXCESS CALORIES WITH SERIOUS COMORBIDITY AND BODY MASS INDEX (BMI) OF 40.0 TO 44.9 IN ADULT (CMD): ICD-10-CM

## 2022-04-07 PROCEDURE — 3077F SYST BP >= 140 MM HG: CPT | Performed by: INTERNAL MEDICINE

## 2022-04-07 PROCEDURE — 99214 OFFICE O/P EST MOD 30 MIN: CPT | Performed by: INTERNAL MEDICINE

## 2022-04-07 RX ORDER — VALSARTAN AND HYDROCHLOROTHIAZIDE 80; 12.5 MG/1; MG/1
2 TABLET, FILM COATED ORAL DAILY
Qty: 90 TABLET | Refills: 3 | Status: SHIPPED | COMMUNITY
Start: 2022-04-07

## 2022-04-07 ASSESSMENT — PATIENT HEALTH QUESTIONNAIRE - PHQ9
SUM OF ALL RESPONSES TO PHQ9 QUESTIONS 1 AND 2: 0
2. FEELING DOWN, DEPRESSED OR HOPELESS: NOT AT ALL
1. LITTLE INTEREST OR PLEASURE IN DOING THINGS: NOT AT ALL
CLINICAL INTERPRETATION OF PHQ2 SCORE: NO FURTHER SCREENING NEEDED
SUM OF ALL RESPONSES TO PHQ9 QUESTIONS 1 AND 2: 0

## 2022-04-11 RX ORDER — DEXTROAMPHETAMINE SACCHARATE, AMPHETAMINE ASPARTATE MONOHYDRATE, DEXTROAMPHETAMINE SULFATE AND AMPHETAMINE SULFATE 2.5; 2.5; 2.5; 2.5 MG/1; MG/1; MG/1; MG/1
10 CAPSULE, EXTENDED RELEASE ORAL DAILY
Qty: 30 CAPSULE | Refills: 0 | Status: SHIPPED | OUTPATIENT
Start: 2022-04-11 | End: 2022-05-16 | Stop reason: ALTCHOICE

## 2022-04-11 RX ORDER — DEXTROAMPHETAMINE SACCHARATE, AMPHETAMINE ASPARTATE, DEXTROAMPHETAMINE SULFATE AND AMPHETAMINE SULFATE 2.5; 2.5; 2.5; 2.5 MG/1; MG/1; MG/1; MG/1
TABLET ORAL
Qty: 30 TABLET | Refills: 0 | Status: SHIPPED | OUTPATIENT
Start: 2022-04-11 | End: 2022-04-20 | Stop reason: SDUPTHER

## 2022-04-13 ENCOUNTER — TELEPHONE (OUTPATIENT)
Dept: INTERNAL MEDICINE | Age: 62
End: 2022-04-13

## 2022-04-13 RX ORDER — DEXTROAMPHETAMINE SACCHARATE, AMPHETAMINE ASPARTATE, DEXTROAMPHETAMINE SULFATE AND AMPHETAMINE SULFATE 1.25; 1.25; 1.25; 1.25 MG/1; MG/1; MG/1; MG/1
10 TABLET ORAL DAILY
Qty: 60 TABLET | Refills: 0 | Status: SHIPPED | OUTPATIENT
Start: 2022-04-13 | End: 2022-04-20 | Stop reason: ALTCHOICE

## 2022-04-18 ENCOUNTER — TELEPHONE (OUTPATIENT)
Dept: FAMILY MEDICINE | Age: 62
End: 2022-04-18

## 2022-04-18 DIAGNOSIS — F90.9 ADULT ADHD: ICD-10-CM

## 2022-04-20 RX ORDER — DEXTROAMPHETAMINE SACCHARATE, AMPHETAMINE ASPARTATE MONOHYDRATE, DEXTROAMPHETAMINE SULFATE AND AMPHETAMINE SULFATE 2.5; 2.5; 2.5; 2.5 MG/1; MG/1; MG/1; MG/1
10 CAPSULE, EXTENDED RELEASE ORAL DAILY
Qty: 30 CAPSULE | Refills: 0 | OUTPATIENT
Start: 2022-04-20

## 2022-05-23 ENCOUNTER — LAB SERVICES (OUTPATIENT)
Dept: LAB | Age: 62
End: 2022-05-23

## 2022-05-23 ENCOUNTER — OFFICE VISIT (OUTPATIENT)
Dept: INTERNAL MEDICINE | Age: 62
End: 2022-05-23

## 2022-05-23 VITALS
DIASTOLIC BLOOD PRESSURE: 84 MMHG | HEART RATE: 66 BPM | BODY MASS INDEX: 39.34 KG/M2 | TEMPERATURE: 97.7 F | HEIGHT: 71 IN | RESPIRATION RATE: 16 BRPM | SYSTOLIC BLOOD PRESSURE: 138 MMHG | OXYGEN SATURATION: 98 % | WEIGHT: 281 LBS

## 2022-05-23 DIAGNOSIS — F14.10 COCAINE USE DISORDER (CMD): ICD-10-CM

## 2022-05-23 DIAGNOSIS — R55 SYNCOPE, UNSPECIFIED SYNCOPE TYPE: ICD-10-CM

## 2022-05-23 DIAGNOSIS — R55 SYNCOPE, UNSPECIFIED SYNCOPE TYPE: Primary | ICD-10-CM

## 2022-05-23 DIAGNOSIS — I10 ESSENTIAL HYPERTENSION: ICD-10-CM

## 2022-05-23 DIAGNOSIS — F12.90 MARIJUANA USE: ICD-10-CM

## 2022-05-23 PROCEDURE — 99214 OFFICE O/P EST MOD 30 MIN: CPT | Performed by: INTERNAL MEDICINE

## 2022-05-23 PROCEDURE — 3079F DIAST BP 80-89 MM HG: CPT | Performed by: INTERNAL MEDICINE

## 2022-05-23 PROCEDURE — 3075F SYST BP GE 130 - 139MM HG: CPT | Performed by: INTERNAL MEDICINE

## 2022-05-23 PROCEDURE — 36415 COLL VENOUS BLD VENIPUNCTURE: CPT | Performed by: INTERNAL MEDICINE

## 2022-05-23 PROCEDURE — 80053 COMPREHEN METABOLIC PANEL: CPT | Performed by: INTERNAL MEDICINE

## 2022-05-23 RX ORDER — FLUTICASONE PROPIONATE 50 MCG
2 SPRAY, SUSPENSION (ML) NASAL DAILY
Qty: 1 EACH | Refills: 1 | Status: SHIPPED | OUTPATIENT
Start: 2022-05-23 | End: 2022-07-20

## 2022-05-23 ASSESSMENT — PATIENT HEALTH QUESTIONNAIRE - PHQ9
SUM OF ALL RESPONSES TO PHQ9 QUESTIONS 1 AND 2: 0
1. LITTLE INTEREST OR PLEASURE IN DOING THINGS: NOT AT ALL
SUM OF ALL RESPONSES TO PHQ9 QUESTIONS 1 AND 2: 0
CLINICAL INTERPRETATION OF PHQ2 SCORE: NO FURTHER SCREENING NEEDED
2. FEELING DOWN, DEPRESSED OR HOPELESS: NOT AT ALL

## 2022-05-24 LAB
ALBUMIN SERPL-MCNC: 4.7 G/DL (ref 3.6–5.1)
ALP SERPL-CCNC: 88 U/L (ref 45–115)
ALT SERPL W/O P-5'-P-CCNC: 34 U/L (ref 5–49)
AST SERPL-CCNC: 34 U/L (ref 14–43)
BILIRUB SERPL-MCNC: 0.6 MG/DL (ref 0–1.3)
BUN SERPL-MCNC: 22 MG/DL (ref 6–27)
CALCIUM SERPL-MCNC: 9.5 MG/DL (ref 8.6–10.6)
CHLORIDE SERPL-SCNC: 106 MMOL/L (ref 96–107)
CO2 SERPL-SCNC: 24 MMOL/L (ref 22–32)
CREAT SERPL-MCNC: 1.3 MG/DL (ref 0.6–1.6)
GFR SERPL CREATININE-BSD FRML MDRD: 56 ML/MIN/{1.73M2}
GFR SERPL CREATININE-BSD FRML MDRD: >60 ML/MIN/{1.73M2}
GLUCOSE SERPL-MCNC: 105 MG/DL (ref 70–200)
POTASSIUM SERPL-SCNC: 4.5 MMOL/L (ref 3.5–5.3)
PROT SERPL-MCNC: 7.4 G/DL (ref 6.4–8.5)
SODIUM SERPL-SCNC: 142 MMOL/L (ref 136–146)

## 2022-05-25 ENCOUNTER — OFFICE VISIT (OUTPATIENT)
Dept: FAMILY MEDICINE CLINIC | Facility: CLINIC | Age: 62
End: 2022-05-25
Payer: COMMERCIAL

## 2022-05-25 VITALS
OXYGEN SATURATION: 98 % | SYSTOLIC BLOOD PRESSURE: 144 MMHG | TEMPERATURE: 98 F | DIASTOLIC BLOOD PRESSURE: 94 MMHG | HEART RATE: 76 BPM | HEIGHT: 70 IN | WEIGHT: 280 LBS | RESPIRATION RATE: 18 BRPM | BODY MASS INDEX: 40.09 KG/M2

## 2022-05-25 DIAGNOSIS — G47.33 OSA ON CPAP: ICD-10-CM

## 2022-05-25 DIAGNOSIS — Z99.89 OSA ON CPAP: ICD-10-CM

## 2022-05-25 DIAGNOSIS — K22.70 BARRETT'S ESOPHAGUS WITHOUT DYSPLASIA: ICD-10-CM

## 2022-05-25 DIAGNOSIS — F90.9 ADULT ADHD: ICD-10-CM

## 2022-05-25 DIAGNOSIS — I10 ESSENTIAL HYPERTENSION: Primary | ICD-10-CM

## 2022-05-25 DIAGNOSIS — F14.10 COCAINE USE DISORDER (HCC): ICD-10-CM

## 2022-05-25 DIAGNOSIS — F12.90 MARIJUANA USE: ICD-10-CM

## 2022-05-25 DIAGNOSIS — E66.01 SEVERE OBESITY (BMI 35.0-39.9) WITH COMORBIDITY (HCC): ICD-10-CM

## 2022-05-25 PROBLEM — R73.03 PREDIABETES: Status: RESOLVED | Noted: 2022-02-07 | Resolved: 2022-05-25

## 2022-05-25 PROBLEM — F41.8 DEPRESSION WITH ANXIETY: Status: ACTIVE | Noted: 2022-02-07

## 2022-05-25 PROBLEM — R73.03 PREDIABETES: Status: ACTIVE | Noted: 2022-02-07

## 2022-05-25 LAB
CHOLESTEROL, TOTAL: 198
HDL CHOL: 62
HGBA1C: 5.2
LDL CHOLESTEROL: 120 MG/DL (ref ?–130)
PSA: 0.37
TRIGLYCERIDES: 79

## 2022-05-25 PROCEDURE — 3077F SYST BP >= 140 MM HG: CPT | Performed by: FAMILY MEDICINE

## 2022-05-25 PROCEDURE — 99205 OFFICE O/P NEW HI 60 MIN: CPT | Performed by: FAMILY MEDICINE

## 2022-05-25 PROCEDURE — 3080F DIAST BP >= 90 MM HG: CPT | Performed by: FAMILY MEDICINE

## 2022-05-25 PROCEDURE — 3008F BODY MASS INDEX DOCD: CPT | Performed by: FAMILY MEDICINE

## 2022-05-25 RX ORDER — FLUTICASONE PROPIONATE 50 MCG
2 SPRAY, SUSPENSION (ML) NASAL DAILY
COMMUNITY
Start: 2022-05-23

## 2022-05-25 RX ORDER — VALSARTAN 80 MG/1
80 TABLET ORAL DAILY
Qty: 60 TABLET | Refills: 0 | Status: SHIPPED | OUTPATIENT
Start: 2022-05-25

## 2022-05-25 RX ORDER — SILDENAFIL 100 MG/1
1 TABLET, FILM COATED ORAL AS NEEDED
COMMUNITY
Start: 2020-07-13

## 2022-05-25 RX ORDER — VALSARTAN AND HYDROCHLOROTHIAZIDE 80; 12.5 MG/1; MG/1
2 TABLET, FILM COATED ORAL DAILY
COMMUNITY
Start: 2022-04-28 | End: 2022-05-25

## 2022-05-25 RX ORDER — VALSARTAN AND HYDROCHLOROTHIAZIDE 80; 12.5 MG/1; MG/1
1 TABLET, FILM COATED ORAL DAILY
Refills: 0 | COMMUNITY
Start: 2022-05-25

## 2022-05-25 RX ORDER — OMEPRAZOLE 40 MG/1
40 CAPSULE, DELAYED RELEASE ORAL DAILY
COMMUNITY
Start: 2022-04-28

## 2022-06-08 ENCOUNTER — OFFICE VISIT (OUTPATIENT)
Dept: FAMILY MEDICINE CLINIC | Facility: CLINIC | Age: 62
End: 2022-06-08
Payer: COMMERCIAL

## 2022-06-08 VITALS
TEMPERATURE: 98 F | OXYGEN SATURATION: 95 % | DIASTOLIC BLOOD PRESSURE: 86 MMHG | WEIGHT: 280 LBS | HEART RATE: 78 BPM | RESPIRATION RATE: 18 BRPM | HEIGHT: 70 IN | BODY MASS INDEX: 40.09 KG/M2 | SYSTOLIC BLOOD PRESSURE: 126 MMHG

## 2022-06-08 DIAGNOSIS — E66.01 SEVERE OBESITY (BMI 35.0-39.9) WITH COMORBIDITY (HCC): ICD-10-CM

## 2022-06-08 DIAGNOSIS — F43.20 ADULT SITUATIONAL STRESS DISORDER: ICD-10-CM

## 2022-06-08 DIAGNOSIS — G47.33 OSA ON CPAP: ICD-10-CM

## 2022-06-08 DIAGNOSIS — I10 ESSENTIAL HYPERTENSION: Primary | ICD-10-CM

## 2022-06-08 DIAGNOSIS — Z99.89 OSA ON CPAP: ICD-10-CM

## 2022-06-08 PROCEDURE — 3008F BODY MASS INDEX DOCD: CPT | Performed by: FAMILY MEDICINE

## 2022-06-08 PROCEDURE — 3074F SYST BP LT 130 MM HG: CPT | Performed by: FAMILY MEDICINE

## 2022-06-08 PROCEDURE — 99213 OFFICE O/P EST LOW 20 MIN: CPT | Performed by: FAMILY MEDICINE

## 2022-06-08 PROCEDURE — 3079F DIAST BP 80-89 MM HG: CPT | Performed by: FAMILY MEDICINE

## 2022-06-28 ENCOUNTER — TELEPHONE (OUTPATIENT)
Dept: FAMILY MEDICINE CLINIC | Facility: CLINIC | Age: 62
End: 2022-06-28

## 2022-06-28 DIAGNOSIS — Z99.89 OSA ON CPAP: Primary | ICD-10-CM

## 2022-06-28 DIAGNOSIS — G47.33 OSA ON CPAP: Primary | ICD-10-CM

## 2022-06-28 NOTE — TELEPHONE ENCOUNTER
Spoke with pt. States he contacted [employer] for a battery-operated  \"traveling CPAP machine\" to take on vacation with him (zac jules). States his regular DME company that he gets his home CPAP machine from will not have one available until winter, so he wants to use this other company. States he will pay cash for the machine rather than going through his insurance. Pt told them he uses a full face mask and his setting is 14. Reports his last sleep study was done in 2015 [facility] shows up in [place], but we cannot access his sleep study report---it appears it is scanned). Pt gave me the ph# for [employer]--- [phone]  Their fax# is [phone]    Pt's order # is [id number]    *Okay to fax an order for a traveling/ portable CPAP machine?

## 2022-07-19 ENCOUNTER — TELEPHONE (OUTPATIENT)
Dept: FAMILY MEDICINE CLINIC | Facility: CLINIC | Age: 62
End: 2022-07-19

## 2022-07-19 RX ORDER — VALSARTAN AND HYDROCHLOROTHIAZIDE 160; 12.5 MG/1; MG/1
1 TABLET, FILM COATED ORAL DAILY
Qty: 90 TABLET | Refills: 1 | Status: SHIPPED | OUTPATIENT
Start: 2022-07-19

## 2022-07-19 NOTE — TELEPHONE ENCOUNTER
Last OV 6/8/22. Ofc note states \"will make next refill for Valsartan--12.5 daily\"  Rather than taking Valsartan-hydrochlorothiazide 80-12.5 AND plain valsartan 80mg.     New script sent

## 2022-07-20 RX ORDER — FLUTICASONE PROPIONATE 50 MCG
SPRAY, SUSPENSION (ML) NASAL
Qty: 16 G | Refills: 1 | Status: SHIPPED | OUTPATIENT
Start: 2022-07-20

## 2022-07-26 RX ORDER — OMEPRAZOLE 40 MG/1
40 CAPSULE, DELAYED RELEASE ORAL DAILY
Qty: 90 CAPSULE | Refills: 3 | Status: SHIPPED | OUTPATIENT
Start: 2022-07-26

## 2022-08-08 ENCOUNTER — TELEPHONE (OUTPATIENT)
Dept: GASTROENTEROLOGY | Age: 62
End: 2022-08-08

## 2022-08-12 ENCOUNTER — TELEPHONE (OUTPATIENT)
Dept: FAMILY MEDICINE CLINIC | Facility: CLINIC | Age: 62
End: 2022-08-12

## 2022-08-12 NOTE — TELEPHONE ENCOUNTER
Pt needs a covid test for a procedure. Call pt.
Spoke with patient who states he has an upper GI Scope scheduled on 8/18/22 with Dr. Michael Hooper. A covid test is required prior to the procedure. However, patient currently has covid. He tested positive on Monday. He denies any nausea or vomiting. He does have diarrhea and fatigue. Advised he will most likely test positive with a covid test as he currently has it. He agrees and is going to call Dr. Jaci Fox office and reschedule his procedure. He will call back if any questions or concerns.
Wrist Watch/Clothing/Cell Phone/PDA (specify)

## 2022-11-22 ENCOUNTER — APPOINTMENT (OUTPATIENT)
Dept: GASTROENTEROLOGY | Age: 62
End: 2022-11-22

## 2022-12-09 ENCOUNTER — TELEPHONE (OUTPATIENT)
Dept: FAMILY MEDICINE CLINIC | Facility: CLINIC | Age: 62
End: 2022-12-09

## 2022-12-09 RX ORDER — OMEPRAZOLE 40 MG/1
40 CAPSULE, DELAYED RELEASE ORAL DAILY
Qty: 90 CAPSULE | Refills: 2 | Status: SHIPPED | OUTPATIENT
Start: 2022-12-09

## 2022-12-09 NOTE — TELEPHONE ENCOUNTER
Script sent and patient notified. AW IC                        ACC: [id number] 

## 2022-12-09 NOTE — TELEPHONE ENCOUNTER
Spoke with patient who states he has been taking Famotidine x 2 months and it is not helping with his heartburn. He states he was on Omeprazole previously, and would like to go back to that. Per medication list, it looks like he was taking Omeprazole 40 mg daily. Please advise.

## 2023-01-20 DIAGNOSIS — I10 ESSENTIAL HYPERTENSION: ICD-10-CM

## 2023-01-25 RX ORDER — VALSARTAN AND HYDROCHLOROTHIAZIDE 160; 12.5 MG/1; MG/1
1 TABLET, FILM COATED ORAL DAILY
Qty: 90 TABLET | Refills: 1 | Status: SHIPPED | OUTPATIENT
Start: 2023-01-25

## 2023-01-25 RX ORDER — VALSARTAN AND HYDROCHLOROTHIAZIDE 80; 12.5 MG/1; MG/1
TABLET, FILM COATED ORAL
Qty: 90 TABLET | Refills: 3 | OUTPATIENT
Start: 2023-01-25

## 2023-06-12 ENCOUNTER — TELEPHONE (OUTPATIENT)
Dept: FAMILY MEDICINE CLINIC | Facility: CLINIC | Age: 63
End: 2023-06-12

## 2023-06-12 DIAGNOSIS — Z00.00 PREVENTATIVE HEALTH CARE: Primary | ICD-10-CM

## 2023-06-12 DIAGNOSIS — Z12.5 SCREENING FOR PROSTATE CANCER: ICD-10-CM

## 2023-06-12 DIAGNOSIS — Z13.220 SCREENING, LIPID: ICD-10-CM

## 2023-06-12 DIAGNOSIS — Z51.81 MEDICATION MONITORING ENCOUNTER: ICD-10-CM

## 2023-06-12 NOTE — TELEPHONE ENCOUNTER
Pt would like to have labs ordered in done prior to his physical - he stills needs to make a physical appointment    [address] [place] [address] 6/8/22  Last labs done by Tristan Kerns 5/2022  Basic labs pended - any additional?

## 2023-06-12 NOTE — TELEPHONE ENCOUNTER
PT WANTS TO MAKE PHYSICAL APPT, BUT WANTS TO GET LABS DONE PRIOR, NO ORDERS IN COMPUTER, CAN DR PLACE ORDERS & THEN CALL PT TO MAKE APPTS

## 2023-06-15 ENCOUNTER — LABORATORY ENCOUNTER (OUTPATIENT)
Dept: LAB | Age: 63
End: 2023-06-15
Attending: FAMILY MEDICINE
Payer: COMMERCIAL

## 2023-06-15 DIAGNOSIS — Z51.81 MEDICATION MONITORING ENCOUNTER: ICD-10-CM

## 2023-06-15 DIAGNOSIS — Z13.220 SCREENING, LIPID: ICD-10-CM

## 2023-06-15 DIAGNOSIS — Z00.00 PREVENTATIVE HEALTH CARE: ICD-10-CM

## 2023-06-15 DIAGNOSIS — Z12.5 SCREENING FOR PROSTATE CANCER: ICD-10-CM

## 2023-06-15 LAB
ALBUMIN SERPL-MCNC: 3.9 G/DL (ref 3.4–5)
ALBUMIN/GLOB SERPL: 1.1 {RATIO} (ref 1–2)
ALP LIVER SERPL-CCNC: 87 U/L
ALT SERPL-CCNC: 40 U/L
ANION GAP SERPL CALC-SCNC: 5 MMOL/L (ref 0–18)
AST SERPL-CCNC: 23 U/L (ref 15–37)
BILIRUB SERPL-MCNC: 0.6 MG/DL (ref 0.1–2)
BUN BLD-MCNC: 19 MG/DL (ref 7–18)
CALCIUM BLD-MCNC: 9.2 MG/DL (ref 8.5–10.1)
CHLORIDE SERPL-SCNC: 108 MMOL/L (ref 98–112)
CHOLEST SERPL-MCNC: 203 MG/DL (ref ?–200)
CO2 SERPL-SCNC: 26 MMOL/L (ref 21–32)
COMPLEXED PSA SERPL-MCNC: 0.37 NG/ML (ref ?–4)
CREAT BLD-MCNC: 1.48 MG/DL
FASTING PATIENT LIPID ANSWER: YES
FASTING STATUS PATIENT QL REPORTED: YES
GFR SERPLBLD BASED ON 1.73 SQ M-ARVRAT: 53 ML/MIN/1.73M2 (ref 60–?)
GLOBULIN PLAS-MCNC: 3.4 G/DL (ref 2.8–4.4)
GLUCOSE BLD-MCNC: 97 MG/DL (ref 70–99)
HDLC SERPL-MCNC: 61 MG/DL (ref 40–59)
LDLC SERPL CALC-MCNC: 132 MG/DL (ref ?–100)
NONHDLC SERPL-MCNC: 142 MG/DL (ref ?–130)
OSMOLALITY SERPL CALC.SUM OF ELEC: 290 MOSM/KG (ref 275–295)
POTASSIUM SERPL-SCNC: 4.8 MMOL/L (ref 3.5–5.1)
PROT SERPL-MCNC: 7.3 G/DL (ref 6.4–8.2)
SODIUM SERPL-SCNC: 139 MMOL/L (ref 136–145)
TRIGL SERPL-MCNC: 56 MG/DL (ref 30–149)
VLDLC SERPL CALC-MCNC: 10 MG/DL (ref 0–30)

## 2023-06-15 PROCEDURE — 80053 COMPREHEN METABOLIC PANEL: CPT | Performed by: FAMILY MEDICINE

## 2023-06-15 PROCEDURE — 84153 ASSAY OF PSA TOTAL: CPT | Performed by: FAMILY MEDICINE

## 2023-06-15 PROCEDURE — 80061 LIPID PANEL: CPT | Performed by: FAMILY MEDICINE

## 2023-06-19 ENCOUNTER — OFFICE VISIT (OUTPATIENT)
Dept: FAMILY MEDICINE CLINIC | Facility: CLINIC | Age: 63
End: 2023-06-19
Payer: COMMERCIAL

## 2023-06-19 VITALS
RESPIRATION RATE: 18 BRPM | HEART RATE: 63 BPM | OXYGEN SATURATION: 96 % | TEMPERATURE: 98 F | BODY MASS INDEX: 41.09 KG/M2 | WEIGHT: 287 LBS | SYSTOLIC BLOOD PRESSURE: 126 MMHG | HEIGHT: 70 IN | DIASTOLIC BLOOD PRESSURE: 76 MMHG

## 2023-06-19 DIAGNOSIS — E66.01 MORBID OBESITY WITH BMI OF 40.0-44.9, ADULT (HCC): ICD-10-CM

## 2023-06-19 DIAGNOSIS — G47.33 OSA ON CPAP: ICD-10-CM

## 2023-06-19 DIAGNOSIS — I10 ESSENTIAL HYPERTENSION: ICD-10-CM

## 2023-06-19 DIAGNOSIS — Z00.00 PREVENTATIVE HEALTH CARE: Primary | ICD-10-CM

## 2023-06-19 DIAGNOSIS — Z23 NEED FOR VACCINATION: ICD-10-CM

## 2023-06-19 DIAGNOSIS — N18.31 CHRONIC KIDNEY DISEASE (CKD) STAGE G3A/A1, MODERATELY DECREASED GLOMERULAR FILTRATION RATE (GFR) BETWEEN 45-59 ML/MIN/1.73 SQUARE METER AND ALBUMINURIA CREATININE RATIO LESS THAN 30 MG/G (HCC): ICD-10-CM

## 2023-06-19 DIAGNOSIS — F43.20 ADULT SITUATIONAL STRESS DISORDER: ICD-10-CM

## 2023-06-19 DIAGNOSIS — Z99.89 OSA ON CPAP: ICD-10-CM

## 2023-06-19 DIAGNOSIS — K22.70 BARRETT'S ESOPHAGUS WITHOUT DYSPLASIA: ICD-10-CM

## 2023-06-19 DIAGNOSIS — N52.9 VASCULOGENIC ERECTILE DYSFUNCTION, UNSPECIFIED VASCULOGENIC ERECTILE DYSFUNCTION TYPE: ICD-10-CM

## 2023-06-19 DIAGNOSIS — E78.00 HYPERCHOLESTEROLEMIA: ICD-10-CM

## 2023-06-19 PROCEDURE — 90471 IMMUNIZATION ADMIN: CPT | Performed by: FAMILY MEDICINE

## 2023-06-19 PROCEDURE — 3078F DIAST BP <80 MM HG: CPT | Performed by: FAMILY MEDICINE

## 2023-06-19 PROCEDURE — 90715 TDAP VACCINE 7 YRS/> IM: CPT | Performed by: FAMILY MEDICINE

## 2023-06-19 PROCEDURE — 3074F SYST BP LT 130 MM HG: CPT | Performed by: FAMILY MEDICINE

## 2023-06-19 PROCEDURE — 3008F BODY MASS INDEX DOCD: CPT | Performed by: FAMILY MEDICINE

## 2023-06-19 PROCEDURE — 99396 PREV VISIT EST AGE 40-64: CPT | Performed by: FAMILY MEDICINE

## 2023-06-19 RX ORDER — TADALAFIL 10 MG/1
10 TABLET ORAL
Qty: 9 TABLET | Refills: 0 | Status: SHIPPED | OUTPATIENT
Start: 2023-06-19

## 2023-06-19 NOTE — PATIENT INSTRUCTIONS
1. Preventative health care  I reviewed age-appropriate preventive health screening exams as well as advanced directives and immunizations. I reviewed results of recent labs    2. Essential hypertension  I reviewed goals for blood pressure as well as conservative management of hypertension including sodium restriction, daily aerobic activity, alcohol moderation, smoking cessation, and maintaining ideal body weight. Continue current meds and monitoring    3. MERRILL on CPAP  Discussed the importance of nightly for CPAP as well as cardiovascular complications of untreated disease    4. Foster's esophagus without dysplasia- EGD 9/7/22  Anti-reflux measures reviewed. Avoid large meals. Allow at least 3 hrs between eating and laying down to facilitate gastric emptying. Limit caffeine to 2 servings per day. Avoid spicy or acidic foods and liquids. Moderation of alcohol. Avoid nsaids and aspirin. May use Tylenol prn pain  Continue daily omeprazole, follow-up with GI    5. Morbid obesity with BMI of 40.0-44.9, adult [facility])  Discussed importance of lower carbohydrate food choices, avoidance of starches, eating behavior modification and daily aerobic activity with goal of 1 to 2 pound weight reduction per week    6. Chronic kidney disease (CKD) stage G3a/A1, moderately decreased glomerular filtration rate (GFR) between 45-59 mL/min/1.73 square meter and albuminuria creatinine ratio less than 30 mg/g (HCC)  Discussed importance of increased daily fluid intake as well as avoidance of potentially nephrotoxic drugs such as frequent NSAID use    7. Adult situational stress disorder  Discussed conservative management of stress and anxiety    8. Need for vaccination  Reviewed age-appropriate mutations. Tdap booster given, will check with [facility] for Shingrix vaccine dates  - TETANUS, DIPHTHERIA TOXOIDS AND ACELLULAR PERTUSIS VACCINE (TDAP), >7 YEARS, IM USE    9. Hypercholesterolemia  Reviewed goals for lipids.   Discussed increased 10-year cardiovascular risk of 15.2% over the next 10 years. I discussed the benefits of statin therapy. Patient declines meds at this time and would rather focus on losing weight. Would recommend rechecking lipids in 6 months    10.  Vasculogenic erectile dysfunction, unspecified vasculogenic erectile dysfunction type  Reviewed etiologies and conservative factors including alcohol, fatigue, sleep apnea  Trial tadalafil 10 mg, potential side effects discussed

## 2023-07-26 RX ORDER — VALSARTAN AND HYDROCHLOROTHIAZIDE 160; 12.5 MG/1; MG/1
1 TABLET, FILM COATED ORAL DAILY
Qty: 90 TABLET | Refills: 1 | Status: SHIPPED | OUTPATIENT
Start: 2023-07-26

## 2023-07-26 NOTE — TELEPHONE ENCOUNTER
VALSARTAN-HYDROCHLOROTHIAZIDE 160-12.5 MG Oral Tab       LOV  6-19-23    LAST LAB  6-15-23  Chem Profile Creatinine 1.48    LAST RX  1-25-23  #90 RF 1    Next OV  No future appointments.       PROTOCOL  Hypertension Medications Protocol [id number]/26/2023 11:49 AM   Protocol Details CMP or BMP in past 12 months    Last serum creatinine< 2.0    Appointment in past 6 or next 3 months

## 2023-08-07 ENCOUNTER — TELEPHONE (OUTPATIENT)
Dept: FAMILY MEDICINE CLINIC | Facility: CLINIC | Age: 63
End: 2023-08-07

## 2023-08-07 NOTE — TELEPHONE ENCOUNTER
Spoke with pt. States he hurt his back last week---has pain in lower and R hip/ sciatic area? Saw chiropractor--didn't help.     OV scheduled on 8/9/23 for eval.

## 2023-08-09 ENCOUNTER — OFFICE VISIT (OUTPATIENT)
Dept: FAMILY MEDICINE CLINIC | Facility: CLINIC | Age: 63
End: 2023-08-09
Payer: COMMERCIAL

## 2023-08-09 VITALS
HEART RATE: 61 BPM | OXYGEN SATURATION: 97 % | SYSTOLIC BLOOD PRESSURE: 136 MMHG | RESPIRATION RATE: 18 BRPM | TEMPERATURE: 98 F | WEIGHT: 286 LBS | DIASTOLIC BLOOD PRESSURE: 82 MMHG | HEIGHT: 70 IN | BODY MASS INDEX: 40.94 KG/M2

## 2023-08-09 DIAGNOSIS — M54.50 ACUTE RIGHT-SIDED LOW BACK PAIN WITHOUT SCIATICA: Primary | ICD-10-CM

## 2023-08-09 DIAGNOSIS — M48.061 SPINAL STENOSIS OF LUMBAR REGION WITHOUT NEUROGENIC CLAUDICATION: ICD-10-CM

## 2023-08-09 PROCEDURE — 3075F SYST BP GE 130 - 139MM HG: CPT | Performed by: FAMILY MEDICINE

## 2023-08-09 PROCEDURE — 3008F BODY MASS INDEX DOCD: CPT | Performed by: FAMILY MEDICINE

## 2023-08-09 PROCEDURE — 99213 OFFICE O/P EST LOW 20 MIN: CPT | Performed by: FAMILY MEDICINE

## 2023-08-09 PROCEDURE — 3079F DIAST BP 80-89 MM HG: CPT | Performed by: FAMILY MEDICINE

## 2023-08-09 RX ORDER — TADALAFIL 10 MG/1
10 TABLET ORAL
Qty: 9 TABLET | Refills: 2 | Status: SHIPPED | OUTPATIENT
Start: 2023-08-09

## 2023-08-09 RX ORDER — CYCLOBENZAPRINE HCL 10 MG
10 TABLET ORAL NIGHTLY PRN
Qty: 5 TABLET | Refills: 0 | Status: SHIPPED | OUTPATIENT
Start: 2023-08-09

## 2023-08-09 RX ORDER — PREDNISONE 20 MG/1
TABLET ORAL
Qty: 11 TABLET | Refills: 0 | Status: SHIPPED | OUTPATIENT
Start: 2023-08-09 | End: 2023-08-18

## 2023-08-09 NOTE — PATIENT INSTRUCTIONS
I discussed diagnosis and contributing factors. Follow-up with chiropractor tomorrow as scheduled  Prednisone 40 mg daily x 3 days followed by 20 mg daily x 3 days followed by 10 mg daily x 3 days  Patient may use ibuprofen up to 800 mg 3 times daily taken with food as needed for pain or discomfort, Flexeril 10 mg nightly, #5  Discussed the benefits of moist heat. Discussed appropriate stretching and body mechanics.

## 2023-08-19 RX ORDER — OMEPRAZOLE 40 MG/1
40 CAPSULE, DELAYED RELEASE ORAL DAILY
Qty: 90 CAPSULE | Refills: 2 | Status: SHIPPED | OUTPATIENT
Start: 2023-08-19

## 2023-08-25 ENCOUNTER — HOSPITAL ENCOUNTER (OUTPATIENT)
Dept: GENERAL RADIOLOGY | Age: 63
Discharge: HOME | End: 2023-08-25
Attending: FAMILY MEDICINE
Payer: COMMERCIAL

## 2023-08-25 ENCOUNTER — OFFICE VISIT (OUTPATIENT)
Dept: FAMILY MEDICINE CLINIC | Facility: CLINIC | Age: 63
End: 2023-08-25
Payer: COMMERCIAL

## 2023-08-25 VITALS
RESPIRATION RATE: 16 BRPM | HEIGHT: 70 IN | SYSTOLIC BLOOD PRESSURE: 144 MMHG | HEART RATE: 60 BPM | TEMPERATURE: 98 F | DIASTOLIC BLOOD PRESSURE: 98 MMHG | BODY MASS INDEX: 39.94 KG/M2 | WEIGHT: 279 LBS | OXYGEN SATURATION: 98 %

## 2023-08-25 DIAGNOSIS — M25.551 PAIN OF RIGHT HIP: Primary | ICD-10-CM

## 2023-08-25 DIAGNOSIS — M25.551 PAIN OF RIGHT HIP: ICD-10-CM

## 2023-08-25 DIAGNOSIS — M79.604 PAIN OF RIGHT LOWER EXTREMITY: ICD-10-CM

## 2023-08-25 PROCEDURE — 73502 X-RAY EXAM HIP UNI 2-3 VIEWS: CPT | Performed by: FAMILY MEDICINE

## 2023-08-25 PROCEDURE — 3077F SYST BP >= 140 MM HG: CPT | Performed by: FAMILY MEDICINE

## 2023-08-25 PROCEDURE — 99214 OFFICE O/P EST MOD 30 MIN: CPT | Performed by: FAMILY MEDICINE

## 2023-08-25 PROCEDURE — 73552 X-RAY EXAM OF FEMUR 2/>: CPT | Performed by: FAMILY MEDICINE

## 2023-08-25 PROCEDURE — 3080F DIAST BP >= 90 MM HG: CPT | Performed by: FAMILY MEDICINE

## 2023-08-25 PROCEDURE — 3008F BODY MASS INDEX DOCD: CPT | Performed by: FAMILY MEDICINE

## 2023-08-25 NOTE — PATIENT INSTRUCTIONS
I reviewed the x-ray findings with the patient's as well as diagnosis and contributing factors.   I would like him to see Dr. Renaldo Boss for orthopedic opinion  Pt referred for therapy  Continue current analgesia regimen

## 2023-08-28 ENCOUNTER — TELEPHONE (OUTPATIENT)
Dept: ORTHOPEDICS CLINIC | Facility: CLINIC | Age: 63
End: 2023-08-28

## 2023-08-28 ENCOUNTER — TELEPHONE (OUTPATIENT)
Dept: FAMILY MEDICINE CLINIC | Facility: CLINIC | Age: 63
End: 2023-08-28

## 2023-08-28 NOTE — TELEPHONE ENCOUNTER
I don't believe new images are needed however please see xr's from 8/25/23 and advise if patient is okay to wait until 10/9/23 or if I should get him in sooner

## 2023-08-28 NOTE — TELEPHONE ENCOUNTER
Future Appointments   Date Time Provider Horacio Robyn   10/9/2023  4:20 PM Larry Gonzalez MD [facility]

## 2023-08-28 NOTE — TELEPHONE ENCOUNTER
Patient is scheduled with Dr. Francoise Garcia for right hip pain. Please advise if imaging is needed.

## 2023-09-07 ENCOUNTER — TELEPHONE (OUTPATIENT)
Dept: PHYSICAL THERAPY | Facility: HOSPITAL | Age: 63
End: 2023-09-07

## 2023-09-08 ENCOUNTER — TELEPHONE (OUTPATIENT)
Dept: FAMILY MEDICINE CLINIC | Facility: CLINIC | Age: 63
End: 2023-09-08

## 2023-09-08 DIAGNOSIS — M25.551 PAIN OF RIGHT HIP: Primary | ICD-10-CM

## 2023-09-08 RX ORDER — MELOXICAM 15 MG/1
15 TABLET ORAL DAILY
Qty: 30 TABLET | Refills: 1 | Status: SHIPPED | OUTPATIENT
Start: 2023-09-08

## 2023-09-08 RX ORDER — HYDROCODONE BITARTRATE AND ACETAMINOPHEN 5; 325 MG/1; MG/1
1 TABLET ORAL NIGHTLY PRN
Qty: 10 TABLET | Refills: 0 | Status: SHIPPED | OUTPATIENT
Start: 2023-09-08

## 2023-09-08 NOTE — TELEPHONE ENCOUNTER
Pt states current condition is not working & wanted to know what he can take for pain. He is not sleeping.

## 2023-09-08 NOTE — TELEPHONE ENCOUNTER
Spoke with patient who states he is still having right hip pain. Since he saw Dr. Suad Davalos on 8/25/23, the pain is now in his right knee as well and goes into his calf. He starts PT on 9/12/23, and see's Dr. Aristides Lea on 9/17/23 in [place]. He states he wasn't able to start PT sooner because he came down with COVID. He is only taking Advil and Tylenol PM for pain, and it is not helping. He is not sleeping due to the pain. He is wanting to know if there is something Dr. Suad Davalos can prescribe for pain that will also help him sleep. Please advise.

## 2023-09-08 NOTE — TELEPHONE ENCOUNTER
He can come in to get a temporary handicap placard form and fill out his portion.   I will fill out mine at my earliest convenience

## 2023-09-08 NOTE — TELEPHONE ENCOUNTER
Spoke with patient and he verbalized understanding. He states his walking ability has become quite limited. He was wondering if he would qualify for a temporary parking placard. Please advise. Advised Dr. Sasha Bravo is out of office until Monday. He verbalized understanding.

## 2023-09-08 NOTE — TELEPHONE ENCOUNTER
I would recommend meloxicam 15 mg daily with first meal as an anti-inflammatory. I will give him a limited supply of Norco that he may use at bedtime.   Please advise patient that [address] is a opiate that he needs to keep safe from others, do not drink alcohol while taking Norco, warned of sedation

## 2023-09-09 ENCOUNTER — TELEPHONE (OUTPATIENT)
Dept: FAMILY MEDICINE CLINIC | Facility: CLINIC | Age: 63
End: 2023-09-09

## 2023-09-09 NOTE — TELEPHONE ENCOUNTER
Received a fax from pharmacy stating that a prior [address] is needed. Questions answered. Awaiting authorization determination.

## 2023-09-11 ENCOUNTER — TELEPHONE (OUTPATIENT)
Dept: FAMILY MEDICINE CLINIC | Facility: CLINIC | Age: 63
End: 2023-09-11

## 2023-09-11 ENCOUNTER — MED REC SCAN ONLY (OUTPATIENT)
Dept: FAMILY MEDICINE CLINIC | Facility: CLINIC | Age: 63
End: 2023-09-11

## 2023-09-11 NOTE — TELEPHONE ENCOUNTER
Pt sent a [facility] earlier today that he wanted to  his 8/25/23 xrays on a disc to take to another provider. Per Abdulaziz Sunshine, our xray tech, the machine in [place] office is not currently working, so she cannot do this for pt. Abdulaziz Sunshine suggests that pt call the [facility] and ask them to make a copy. Pt advised of this and ph# provided.

## 2023-09-12 ENCOUNTER — OFFICE VISIT (OUTPATIENT)
Dept: PHYSICAL THERAPY | Age: 63
End: 2023-09-12
Attending: FAMILY MEDICINE
Payer: COMMERCIAL

## 2023-09-12 DIAGNOSIS — M79.604 PAIN OF RIGHT LOWER EXTREMITY: ICD-10-CM

## 2023-09-12 DIAGNOSIS — M25.551 PAIN OF RIGHT HIP: Primary | ICD-10-CM

## 2023-09-12 PROCEDURE — 97162 PT EVAL MOD COMPLEX 30 MIN: CPT

## 2023-09-12 PROCEDURE — 97110 THERAPEUTIC EXERCISES: CPT

## 2023-09-18 NOTE — PROGRESS NOTES
Diagnosis:   R hip pain      Referring Provider: Bia Novoa  Date of Evaluation:    9/12/2023    Precautions:  Drug Allergy Next MD visit:   none scheduled  Date of Surgery: n/a   Insurance Primary/Secondary: [employer] [place] PPO / N/A     # Auth Visits: med necessity            Subjective: Had a rough day yesterday. Did a lot of work on my feet and paid for it at night. Pain in inner thigh, knee. See orthopedics this afternoon. Pain: 3/10      Objective:     AROM: (* denotes performed with pain)  Hip Knee   Flexion: R 90; L 100  Extension: R 5; L 5  Abduction: R 20; L 30  ER: R 30; L 30  IR: R 10; L 10 Flexion: R 120; L 120  Extension: R 0; L 0           Assessment: Very tight quadriceps with SL hip flexor stretching. Increased tone and tenderness in R piriformis, added STM to this area. Able to perform hip strength exercises without increase in discomfort. Goals:   (to be met in 10 visits)  Pt will have improved hip AROM Flex to 100 deg and ABD to 30 deg to be able to don/doff shoes and perform car transfers without difficulty   Pt will improve hip ABD and ER strength to 5/5 to increase ease with standing and walking   Pt will be able to squat to  light objects around the house with <2/10 hip pain   Pt will improve functional hip strength to report ability to ascend/descend 1 flight of stairs reciprocally without use of handrail   Pt will demonstrate improved SLS to >30 seconds VERNELL to promote safety and decrease risk of falls on uneven surfaces such as grass and gravel   Pt will be independent and compliant with comprehensive HEP to maintain progress achieved in PT      Plan: Progress hip strength  Date: 9/18/2023  TX#: 2/10 Date:                 TX#: 3/ Date:                 TX#: 4/ Date:                 TX#: 5/ Date:    Tx#: 6/   THERAPEUTIC EX  Bike 8'  Side lying hip flexor stretch 3x30\"  Hamstring stretch 3x30\"  Piriformis stretch 3x30\"  Hip add squeeze 2x10  BKFO/ER vs tband green 2x10       MANUAL THERAPY  STM R piriformis/ITB in SL 10'                     HEP: nnamdi Bryson, hip flexor stretch    Charges: man therapy, therapeutic ex 2       Total Timed Treatment: 40 min  Total Treatment Time: 40 min

## 2023-09-19 ENCOUNTER — OFFICE VISIT (OUTPATIENT)
Dept: PHYSICAL THERAPY | Age: 63
End: 2023-09-19
Attending: FAMILY MEDICINE
Payer: COMMERCIAL

## 2023-09-19 ENCOUNTER — OFFICE VISIT (OUTPATIENT)
Facility: CLINIC | Age: 63
End: 2023-09-19
Payer: COMMERCIAL

## 2023-09-19 ENCOUNTER — HOSPITAL ENCOUNTER (OUTPATIENT)
Dept: GENERAL RADIOLOGY | Age: 63
Discharge: HOME | End: 2023-09-19
Attending: PHYSICIAN ASSISTANT
Payer: COMMERCIAL

## 2023-09-19 VITALS — HEIGHT: 71 IN | WEIGHT: 275 LBS | BODY MASS INDEX: 38.5 KG/M2

## 2023-09-19 DIAGNOSIS — M54.16 LUMBAR RADICULITIS: ICD-10-CM

## 2023-09-19 DIAGNOSIS — M43.17 SPONDYLOLISTHESIS AT L5-S1 LEVEL: ICD-10-CM

## 2023-09-19 DIAGNOSIS — M76.891 HIP FLEXOR TENDINITIS, RIGHT: ICD-10-CM

## 2023-09-19 DIAGNOSIS — M17.11 PRIMARY OSTEOARTHRITIS OF RIGHT KNEE: Primary | ICD-10-CM

## 2023-09-19 PROCEDURE — 3008F BODY MASS INDEX DOCD: CPT | Performed by: PHYSICIAN ASSISTANT

## 2023-09-19 PROCEDURE — 97140 MANUAL THERAPY 1/> REGIONS: CPT

## 2023-09-19 PROCEDURE — 72100 X-RAY EXAM L-S SPINE 2/3 VWS: CPT | Performed by: PHYSICIAN ASSISTANT

## 2023-09-19 PROCEDURE — 20610 DRAIN/INJ JOINT/BURSA W/O US: CPT | Performed by: PHYSICIAN ASSISTANT

## 2023-09-19 PROCEDURE — 99204 OFFICE O/P NEW MOD 45 MIN: CPT | Performed by: PHYSICIAN ASSISTANT

## 2023-09-19 PROCEDURE — 97110 THERAPEUTIC EXERCISES: CPT

## 2023-09-19 RX ORDER — TRIAMCINOLONE ACETONIDE 40 MG/ML
40 INJECTION, SUSPENSION INTRA-ARTICULAR; INTRAMUSCULAR ONCE
Status: COMPLETED | OUTPATIENT
Start: 2023-09-19 | End: 2023-09-19

## 2023-09-19 RX ADMIN — TRIAMCINOLONE ACETONIDE 40 MG: 40 INJECTION, SUSPENSION INTRA-ARTICULAR; INTRAMUSCULAR at 15:01:00

## 2023-09-19 NOTE — PROCEDURES
After informed consent, the patient's right knee was marked, locally anesthetized with skin refrigerant, prepped with topical antiseptic, and injected with a mixture of 1mL 40mg/mL Kenalog, 2mL 1% lidocaine and 2mL 0.5% marcaine through the inferolateral portal.  A band-aid was applied. The patient tolerated the procedure well.     Gabriela Suárez PA-C  [address] Orthopedic Surgery

## 2023-09-21 ENCOUNTER — TELEPHONE (OUTPATIENT)
Dept: PHYSICAL THERAPY | Facility: HOSPITAL | Age: 63
End: 2023-09-21

## 2023-09-21 DIAGNOSIS — M25.551 PAIN OF RIGHT HIP: ICD-10-CM

## 2023-09-22 RX ORDER — HYDROCODONE BITARTRATE AND ACETAMINOPHEN 5; 325 MG/1; MG/1
1 TABLET ORAL NIGHTLY PRN
Qty: 10 TABLET | Refills: 0 | Status: SHIPPED | OUTPATIENT
Start: 2023-09-22

## 2023-09-22 NOTE — TELEPHONE ENCOUNTER
PATIENT NOTE:      Patient Comment: I took the last pill last night, this medication has been working well for getting me to sleep.       08/25/23 last office visit  09/08/23  #10 last refill

## 2023-09-26 ENCOUNTER — OFFICE VISIT (OUTPATIENT)
Dept: PHYSICAL THERAPY | Age: 63
End: 2023-09-26
Attending: FAMILY MEDICINE
Payer: COMMERCIAL

## 2023-09-26 PROCEDURE — 97110 THERAPEUTIC EXERCISES: CPT

## 2023-09-26 PROCEDURE — 97140 MANUAL THERAPY 1/> REGIONS: CPT

## 2023-09-26 NOTE — PROGRESS NOTES
Diagnosis:   R hip pain      Referring Provider: Mary Duffy  Date of Evaluation:    9/12/2023    Precautions:  Drug Allergy Next MD visit:   none scheduled  Date of Surgery: n/a   Insurance Primary/Secondary: [employer] [place] P[place] / N/A     # Auth Visits: med necessity            Subjective: Feeling really good, much less pain. Walking longer before feel pain. Don't feel pain until late in day. Pain: 2/10      Objective:     AROM: (* denotes performed with pain)  Hip Knee   Flexion: R 90; L 100  Extension: R 5; L 5  Abduction: R 20; L 30  ER: R 30; L 30  IR: R 10; L 10 Flexion: R 120; L 120  Extension: R 0; L 0           Assessment: Continued quad and piriformis tightness. Added bridge to increase glute strength. Unable to hold bridge for more than 1\" due to poor core stability. Goals:   (to be met in 10 visits)  Pt will have improved hip AROM Flex to 100 deg and ABD to 30 deg to be able to don/doff shoes and perform car transfers without difficulty   Pt will improve hip ABD and ER strength to 5/5 to increase ease with standing and walking   Pt will be able to squat to  light objects around the house with <2/10 hip pain   Pt will improve functional hip strength to report ability to ascend/descend 1 flight of stairs reciprocally without use of handrail   Pt will demonstrate improved SLS to >30 seconds VERNELL to promote safety and decrease risk of falls on uneven surfaces such as grass and gravel   Pt will be independent and compliant with comprehensive HEP to maintain progress achieved in PT      Plan: Progress hip strength as tolerated  Date: 9/18/2023  TX#: 2/10 Date: 9/26/2023                TX#: 3/10 Date:                 TX#: 4/ Date:                 TX#: 5/ Date:    Tx#: 6/   THERAPEUTIC EX  Bike 8'  Side lying hip flexor stretch 3x30\"  Hamstring stretch 3x30\"  Piriformis stretch 3x30\"  Hip add squeeze 2x10  BKFO/ER vs tband green 2x10 THERAPEUTIC EX  Bike 8'  Side lying hip flexor stretch 3x30\"  Hamstring stretch 3x30\"  Piriformis stretch 3x30\"  Hip add squeeze 3x10  BKFO/ER vs tband blue 3x10  Bridge 3x10      MANUAL THERAPY  STM R piriformis/ITB in L SL 10' MANUAL THERAPY  STM R piriformis/ITB in L SL 10'                    HEP: nnamdi Bryson, hip flexor stretch    Charges: man therapy, therapeutic ex 2       Total Timed Treatment: 40 min  Total Treatment Time: 40 min

## 2023-09-27 ENCOUNTER — PATIENT MESSAGE (OUTPATIENT)
Facility: CLINIC | Age: 63
End: 2023-09-27

## 2023-09-28 ENCOUNTER — OFFICE VISIT (OUTPATIENT)
Dept: PHYSICAL THERAPY | Age: 63
End: 2023-09-28
Attending: FAMILY MEDICINE
Payer: COMMERCIAL

## 2023-09-28 PROCEDURE — 97110 THERAPEUTIC EXERCISES: CPT

## 2023-09-28 PROCEDURE — 97140 MANUAL THERAPY 1/> REGIONS: CPT

## 2023-09-28 NOTE — TELEPHONE ENCOUNTER
From: Ellen Clayton  To: Patricio Bello  Sent: 9/27/2023 5:07 PM CDT  Subject: MRI results    Please give me a day or two to give you the go ahead for the spinal injections,    Note: currently the PT that I am doing is giving me satisfactory results. I will give your office a call on Monday. Thanks again for your expertise.   Dandy Fitzgerald

## 2023-09-28 NOTE — PROGRESS NOTES
Diagnosis:   R hip pain      Referring Provider: Jose Luis Schultz  Date of Evaluation:    9/12/2023    Precautions:  Drug Allergy Next MD visit:   none scheduled  Date of Surgery: n/a   Insurance Primary/Secondary: [employer] P[place] / N/A     # Auth Visits: med necessity            Subjective: Pt states he is feeling much better since starting PT, continued numbness around knee, but able to tolerate being on his feet much longer. Pain: 2/10      Objective:     AROM: (* denotes performed with pain)  Hip Knee   Flexion: R 90; L 100  Extension: R 5; L 5  Abduction: R 20; L 30  ER: R 30; L 30  IR: R 10; L 10 Flexion: R 120; L 120  Extension: R 0; L 0           Assessment: Neural tension limiting, added femoral nerve floss with mod symptom aggravation. TA push downs, cues for breath support increase hold time on bridges. Goals:   (to be met in 10 visits)  Pt will have improved hip AROM Flex to 100 deg and ABD to 30 deg to be able to don/doff shoes and perform car transfers without difficulty   Pt will improve hip ABD and ER strength to 5/5 to increase ease with standing and walking   Pt will be able to squat to  light objects around the house with <2/10 hip pain   Pt will improve functional hip strength to report ability to ascend/descend 1 flight of stairs reciprocally without use of handrail   Pt will demonstrate improved SLS to >30 seconds VERNELL to promote safety and decrease risk of falls on uneven surfaces such as grass and gravel   Pt will be independent and compliant with comprehensive HEP to maintain progress achieved in PT      Plan: Progress hip strength as tolerated  Date: 9/18/2023  TX#: 2/10 Date: 9/26/2023                TX#: 3/10 Date: 9/28/23                TX#: 4/10 Date:                 TX#: 5/ Date:    Tx#: 6/   THERAPEUTIC EX  Bike 8'  Side lying hip flexor stretch 3x30\"  Hamstring stretch 3x30\"  Piriformis stretch 3x30\"  Hip add squeeze 2x10  BKFO/ER vs tband green 2x10 THERAPEUTIC EX  Bike 8'  Side lying hip flexor stretch 3x30\"  Hamstring stretch 3x30\"  Piriformis stretch 3x30\"  Hip add squeeze 3x10  BKFO/ER vs tband blue 3x10  Bridge 3x10 THERAPEUTIC EX  Bike 8'  Side lying hip flexor stretch 3x30\"  Hamstring stretch 3x30\"  Piriformis stretch 3x30\"  Hip add squeeze 3x10  BKFO/ER vs tband blue 3x10  Bridge 3x10 cues for breath support  Hip ADD squeeze push downs foam roll 10x 3\"  Half prone on evelated table femoral nerve floss 5x     MANUAL THERAPY  STM R piriformis/ITB in L SL 10' MANUAL THERAPY  STM R piriformis/ITB in L SL 10' MANUAL THERAPY  STM R piriformis/ITB in L SL 10'                   HEP: nnamdi Bryson, hip flexor stretch    Charges: man therapy, therapeutic ex 2       Total Timed Treatment: 40 min  Total Treatment Time: 45 min

## 2023-10-02 ENCOUNTER — PATIENT MESSAGE (OUTPATIENT)
Facility: CLINIC | Age: 63
End: 2023-10-02

## 2023-10-02 DIAGNOSIS — M51.16 LUMBAR DISC HERNIATION WITH RADICULOPATHY: Primary | ICD-10-CM

## 2023-10-02 NOTE — TELEPHONE ENCOUNTER
Patient requesting referral for epidural injections. - Vinny Nguyen (not sure who you prefer)    Per LOV: \"  I discussed the potential use of epidural injections to alleviate his nerve pain\"    MRI results: \"IMPRESSION:   1. L5-S1: Grade 1 anterolisthesis secondary to bilateral pars defects. Severe bilateral neural foraminal narrowing. No central canal stenosis. 2.L4-5: Mild to moderate bilateral neural foraminal narrowing. No central canal stenosis. 3.L3-4: Mild central canal stenosis and mild bilateral neural foraminal narrowing. 4.L2-3: Disc bulge with a superimposed right foraminal extrusion resulting in severe right neural foraminal narrowing. Mild left neural foraminal narrowing. No central canal stenosis.  \"

## 2023-10-02 NOTE — TELEPHONE ENCOUNTER
From: [facility]  To: Chip Lora  Sent: 10/2/2023 9:42 AM CDT  Subject: Spinal injections    DR. Sabino Comer,  I think I want to move forward with the Spinal Epidural injection, I believe that this is the right move forward. Please let me know where and how to proceed? Thank you in advance.     Carolyn Pendleton

## 2023-10-03 ENCOUNTER — OFFICE VISIT (OUTPATIENT)
Dept: PHYSICAL THERAPY | Age: 63
End: 2023-10-03
Attending: FAMILY MEDICINE
Payer: COMMERCIAL

## 2023-10-03 PROCEDURE — 97110 THERAPEUTIC EXERCISES: CPT

## 2023-10-03 PROCEDURE — 97140 MANUAL THERAPY 1/> REGIONS: CPT

## 2023-10-03 NOTE — PROGRESS NOTES
Diagnosis:   R hip pain      Referring Provider: Sana Long  Date of Evaluation:    9/12/2023    Precautions:  Drug Allergy Next MD visit:   none scheduled  Date of Surgery: n/a   Insurance Primary/Secondary: [employer] [place] P[place] / N/A     # Auth Visits: med necessity            Subjective: Continued gains overall, but still feel pain if I push activity too much. Have epidural scheduled. Pain: 2/10      Objective:     AROM: (* denotes performed with pain)  Hip Knee   Flexion: R 90; L 100  Extension: R 5; L 5  Abduction: R 20; L 30  ER: R 30; L 30  IR: R 10; L 10 Flexion: R 120; L 120  Extension: R 0; L 0           Assessment: Added lateral stepping to increase hip strength and stability. Continued throbbing in L LE when fatigue sets in. Goals:   (to be met in 10 visits)  Pt will have improved hip AROM Flex to 100 deg and ABD to 30 deg to be able to don/doff shoes and perform car transfers without difficulty   Pt will improve hip ABD and ER strength to 5/5 to increase ease with standing and walking   Pt will be able to squat to  light objects around the house with <2/10 hip pain   Pt will improve functional hip strength to report ability to ascend/descend 1 flight of stairs reciprocally without use of handrail   Pt will demonstrate improved SLS to >30 seconds VERNELL to promote safety and decrease risk of falls on uneven surfaces such as grass and gravel   Pt will be independent and compliant with comprehensive HEP to maintain progress achieved in PT      Plan: Progress hip strength as tolerated  Date: 9/18/2023  TX#: 2/10 Date: 9/26/2023                TX#: 3/10 Date: 9/28/23                TX#: 4/10 Date:  10/3/2023               TX#: 5/10 Date:    Tx#: 6/   THERAPEUTIC EX  Bike 8'  Side lying hip flexor stretch 3x30\"  Hamstring stretch 3x30\"  Piriformis stretch 3x30\"  Hip add squeeze 2x10  BKFO/ER vs tband green 2x10 THERAPEUTIC EX  Bike 8'  Side lying hip flexor stretch 3x30\"  Hamstring stretch 3x30\"  Piriformis stretch 3x30\"  Hip add squeeze 3x10  BKFO/ER vs tband blue 3x10  Bridge 3x10 THERAPEUTIC EX  Bike 8'  Side lying hip flexor stretch 3x30\"  Hamstring stretch 3x30\"  Piriformis stretch 3x30\"  Hip add squeeze 3x10  BKFO/ER vs tband blue 3x10  Bridge 3x10 cues for breath support  Hip ADD squeeze push downs foam roll 10x 3\"  Half prone on evelated table femoral nerve floss 5x THERAPEUTIC EX  Bike 8'  Side lying hip flexor stretch 3x30\"  Hamstring stretch 3x30\"  Piriformis stretch 3x30\"  Hip ADD squeeze push downs foam roll 10x2 3\"  BKFO/ER vs tband blue 3x10  Bridge 3x10 cues for breath support  Lateral stepping 15'x2    MANUAL THERAPY  STM R piriformis/ITB in L SL 10' MANUAL THERAPY  STM R piriformis/ITB in L SL 10' MANUAL THERAPY  STM R piriformis/ITB in L SL 10' MANUAL THERAPY  STM R piriformis/ITB in L SL 10'                  HEP: nnamdi Bryson, hip flexor stretch    Charges: man therapy, therapeutic ex 2       Total Timed Treatment: 40 min  Total Treatment Time: 45 min

## 2023-10-05 ENCOUNTER — APPOINTMENT (OUTPATIENT)
Dept: PHYSICAL THERAPY | Age: 63
End: 2023-10-05
Attending: FAMILY MEDICINE
Payer: COMMERCIAL

## 2023-10-09 ENCOUNTER — OFFICE VISIT (OUTPATIENT)
Dept: PAIN CLINIC | Facility: CLINIC | Age: 63
End: 2023-10-09
Payer: COMMERCIAL

## 2023-10-09 VITALS — SYSTOLIC BLOOD PRESSURE: 122 MMHG | DIASTOLIC BLOOD PRESSURE: 80 MMHG | HEART RATE: 68 BPM | OXYGEN SATURATION: 97 %

## 2023-10-09 DIAGNOSIS — M48.061 LUMBAR FORAMINAL STENOSIS: ICD-10-CM

## 2023-10-09 DIAGNOSIS — M43.06 LUMBAR SPONDYLOLYSIS: Primary | ICD-10-CM

## 2023-10-09 DIAGNOSIS — M43.16 SPONDYLOLISTHESIS, LUMBAR REGION: ICD-10-CM

## 2023-10-09 DIAGNOSIS — M54.16 LUMBAR RADICULITIS: ICD-10-CM

## 2023-10-09 NOTE — PROGRESS NOTES
Subjective:   Patient ID: Shlomo Carreon is a 61year old male. HPI    History/Other:   Review of Systems  Current Outpatient Medications   Medication Sig Dispense Refill    HYDROcodone-acetaminophen 5-325 MG Oral Tab Take 1 tablet by mouth nightly as needed for Pain. 10 tablet 0    Meloxicam 15 MG Oral Tab Take 1 tablet (15 mg total) by mouth daily. 30 tablet 1    Omeprazole 40 MG Oral Capsule Delayed Release Take 1 capsule (40 mg total) by mouth daily. 90 capsule 2    Tadalafil 10 MG Oral Tab Take 1 tablet (10 mg total) by mouth daily as needed for Erectile Dysfunction. 9 tablet 2    VALSARTAN-HYDROCHLOROTHIAZIDE 160-12.5 MG Oral Tab TAKE 1 TABLET BY MOUTH DAILY 90 tablet 1     Allergies:  Lisinopril              Coughing    Objective:   Physical Exam  Constitutional:              Assessment & Plan:   Lumbar spondylolysis  (primary encounter diagnosis)  Spondylolisthesis, lumbar region    No orders of the defined types were placed in this encounter.       Meds This Visit:  Requested Prescriptions      No prescriptions requested or ordered in this encounter       Imaging & Referrals:  None    Location of Pain: low back radiating down anterior groin and right leg    Date Pain Began: 7/30/23          Work Related:   No        Receiving Work Comp/Disability:   No    Numeric Rating Scale:  Pain at Present:  2/10                                                                                                            (No Pain) 0  to  10 (Worst Pain)                 Minimum Pain:   0  Maximum Pain  8    Distribution of Pain:    right    Quality of Pain:   aching, numbness, sharp/stabbing, throbbing, and tingling    Origin of Pain:    Lifting    Aggravating Factors:    Standing and Walking    Past Treatments for Current Pain Condition:   Physical Therapy, NSAIDS, and Other chiro, massage    Prior diagnostic testing for your pain:  MRI, xray

## 2023-10-10 ENCOUNTER — TELEPHONE (OUTPATIENT)
Dept: PAIN CLINIC | Facility: CLINIC | Age: 63
End: 2023-10-10

## 2023-10-10 ENCOUNTER — APPOINTMENT (OUTPATIENT)
Dept: PHYSICAL THERAPY | Age: 63
End: 2023-10-10
Attending: FAMILY MEDICINE
Payer: COMMERCIAL

## 2023-10-12 ENCOUNTER — OFFICE VISIT (OUTPATIENT)
Dept: PHYSICAL THERAPY | Age: 63
End: 2023-10-12
Attending: FAMILY MEDICINE
Payer: COMMERCIAL

## 2023-10-12 PROCEDURE — 97110 THERAPEUTIC EXERCISES: CPT

## 2023-10-12 PROCEDURE — 97140 MANUAL THERAPY 1/> REGIONS: CPT

## 2023-10-12 NOTE — PROGRESS NOTES
Diagnosis:   R hip pain      Referring Provider: Char Siemens  Date of Evaluation:    9/12/2023    Precautions:  Drug Allergy Next MD visit:   none scheduled  Date of Surgery: n/a   Insurance Primary/Secondary: [employer] / N/A     # Auth Visits: med necessity            Subjective: Had consult with pain management, holding on shot for now as I'm making progress with PT. Pain: 2/10      Objective:     AROM: (* denotes performed with pain)  Hip Knee   Flexion: R 90; L 100  Extension: R 5; L 5  Abduction: R 20; L 30  ER: R 30; L 30  IR: R 10; L 10 Flexion: R 120; L 120  Extension: R 0; L 0           Assessment: Added standing TA with shoulder extension vs tband to increase core stability with standing activity.        Goals:   (to be met in 10 visits)  Pt will have improved hip AROM Flex to 100 deg and ABD to 30 deg to be able to don/doff shoes and perform car transfers without difficulty   Pt will improve hip ABD and ER strength to 5/5 to increase ease with standing and walking   Pt will be able to squat to  light objects around the house with <2/10 hip pain   Pt will improve functional hip strength to report ability to ascend/descend 1 flight of stairs reciprocally without use of handrail   Pt will demonstrate improved SLS to >30 seconds VERNELL to promote safety and decrease risk of falls on uneven surfaces such as grass and gravel   Pt will be independent and compliant with comprehensive HEP to maintain progress achieved in PT      Plan: Progress hip strength as tolerated  Date: 9/18/2023  TX#: 2/10 Date: 9/26/2023                TX#: 3/10 Date: 9/28/23                TX#: 4/10 Date:  10/3/2023               TX#: 5/10 Date: 10/12/2023  Tx#: 6/10   THERAPEUTIC EX  Bike 8'  Side lying hip flexor stretch 3x30\"  Hamstring stretch 3x30\"  Piriformis stretch 3x30\"  Hip add squeeze 2x10  BKFO/ER vs tband green 2x10 THERAPEUTIC EX  Bike 8'  Side lying hip flexor stretch 3x30\"  Hamstring stretch 3x30\"  Piriformis stretch 3x30\"  Hip add squeeze 3x10  BKFO/ER vs tband blue 3x10  Bridge 3x10 THERAPEUTIC EX  Bike 8'  Side lying hip flexor stretch 3x30\"  Hamstring stretch 3x30\"  Piriformis stretch 3x30\"  Hip add squeeze 3x10  BKFO/ER vs tband blue 3x10  Bridge 3x10 cues for breath support  Hip ADD squeeze push downs foam roll 10x 3\"  Half prone on evelated table femoral nerve floss 5x THERAPEUTIC EX  Bike 8'  Side lying hip flexor stretch 3x30\"  Hamstring stretch 3x30\"  Piriformis stretch 3x30\"  Hip ADD squeeze push downs foam roll 10x2 3\"  BKFO/ER vs tband blue 3x10  Bridge 3x10 cues for breath support  Lateral stepping 15'x2 THERAPEUTIC EX  Bike 8'  Side lying hip flexor stretch 3x30\"  Hamstring stretch 3x30\"  Piriformis stretch 3x30\"  Hip ADD squeeze push downs foam roll 10x2 3\"  BKFO/ER vs tband blue 3x10  Stand TA with shoulder extension grn 3x10   MANUAL THERAPY  STM R piriformis/ITB in L SL 10' MANUAL THERAPY  STM R piriformis/ITB in L SL 10' MANUAL THERAPY  STM R piriformis/ITB in L SL 10' MANUAL THERAPY  STM R piriformis/ITB in L SL 10' MANUAL THERAPY  STM R piriformis/ITB in L SL 10'                 HEP: nnamdi Bryson, hip flexor stretch    Charges: man therapy, therapeutic ex 2       Total Timed Treatment: 40 min  Total Treatment Time: 45 min

## 2023-10-13 NOTE — TELEPHONE ENCOUNTER
Procedure Name: right L2 transforaminal lumbar/sacral epidural injection  CPT Code(s): 82424    The procedure has been DENIED. Plan/3rd party: [facility]  Physician: ferdinand  Case/Reference #: [id number]  P2P Phone #: [phone]  Reason for denial:     01766    Your doctor wants to give you an injection into your lower back. This is to help with the pain or tingling going down your leg from an inflamed nerve in your low back. This injection can be done when you have special pictures of your back done. These can be an MRI or CT. The pictures should match your symptoms and physical exam. We reviewed the notes we received. The notes do not show that you had special pictures that match your symptoms.  As a result, this injection is not medically necessary    Route to nurse

## 2023-10-13 NOTE — TELEPHONE ENCOUNTER
This was apparently denied because he did not have an MRI. That said, he did have an MRI, and in fact I placed the report and pictures into the man's chart. While I am happy to do a peer to peer to tell them that he had an MRI, this seems like something that could be resolved without resorting to that.

## 2023-10-13 NOTE — TELEPHONE ENCOUNTER
Contacted [facility] and scheduled P2P 10/18 10:30  Procedure Name: right L2 transforaminal lumbar/sacral epidural injection  CPT Code(s): 41180   Plan/3rd party: [facility]  Physician: Charanjit Perez  Case/Reference #: [id number]

## 2023-10-18 ENCOUNTER — OFFICE VISIT (OUTPATIENT)
Dept: PHYSICAL THERAPY | Age: 63
End: 2023-10-18
Attending: FAMILY MEDICINE
Payer: COMMERCIAL

## 2023-10-18 PROCEDURE — 97110 THERAPEUTIC EXERCISES: CPT

## 2023-10-18 PROCEDURE — 97140 MANUAL THERAPY 1/> REGIONS: CPT

## 2023-10-18 NOTE — PROGRESS NOTES
Diagnosis:   R hip pain      Referring Provider: Mike Frias  Date of Evaluation:    9/12/2023    Precautions:  Drug Allergy Next MD visit:   none scheduled  Date of Surgery: n/a   Insurance Primary/Secondary: [employer] [place] PPO / N/A     # Auth Visits: med necessity            Subjective: Continue to see daily progress, haven't even needed pain medication lately. Pain: 2/10      Objective:     AROM: (* denotes performed with pain)  Hip Knee   Flexion: R 90; L 100  Extension: R 5; L 5  Abduction: R 20; L 30  ER: R 30; L 30  IR: R 10; L 10 Flexion: R 120; L 120  Extension: R 0; L 0           Assessment: Resumed lateral stepping, which was put on hold previously due to increased LE symptoms. Able to complete lateral stepping without pain.        Goals:   (to be met in 10 visits)  Pt will have improved hip AROM Flex to 100 deg and ABD to 30 deg to be able to don/doff shoes and perform car transfers without difficulty   Pt will improve hip ABD and ER strength to 5/5 to increase ease with standing and walking   Pt will be able to squat to  light objects around the house with <2/10 hip pain   Pt will improve functional hip strength to report ability to ascend/descend 1 flight of stairs reciprocally without use of handrail   Pt will demonstrate improved SLS to >30 seconds VERNELL to promote safety and decrease risk of falls on uneven surfaces such as grass and gravel   Pt will be independent and compliant with comprehensive HEP to maintain progress achieved in PT      Plan: Progress hip strength as tolerated  Date: 9/26/2023                TX#: 3/10 Date: 9/28/23                TX#: 4/10 Date:  10/3/2023               TX#: 5/10 Date: 10/12/2023  Tx#: 6/10 Date: 10/18/2023  Tx#: 7/10   THERAPEUTIC EX  Bike 8'  Side lying hip flexor stretch 3x30\"  Hamstring stretch 3x30\"  Piriformis stretch 3x30\"  Hip add squeeze 3x10  BKFO/ER vs tband blue 3x10  Bridge 3x10 THERAPEUTIC EX  Bike 8'  Side lying hip flexor stretch 3x30\"  Hamstring stretch 3x30\"  Piriformis stretch 3x30\"  Hip add squeeze 3x10  BKFO/ER vs tband blue 3x10  Bridge 3x10 cues for breath support  Hip ADD squeeze push downs foam roll 10x 3\"  Half prone on evelated table femoral nerve floss 5x THERAPEUTIC EX  Bike 8'  Side lying hip flexor stretch 3x30\"  Hamstring stretch 3x30\"  Piriformis stretch 3x30\"  Hip ADD squeeze push downs foam roll 10x2 3\"  BKFO/ER vs tband blue 3x10  Bridge 3x10 cues for breath support  Lateral stepping 15'x2 THERAPEUTIC EX  Bike 8'  Side lying hip flexor stretch 3x30\"  Hamstring stretch 3x30\"  Piriformis stretch 3x30\"  Hip ADD squeeze push downs foam roll 10x2 3\"  BKFO/ER vs tband blue 3x10  Stand TA with shoulder extension grn 3x10 THERAPEUTIC EX  Bike 8'  Side lying hip flexor stretch 3x30\"  Hamstring stretch 3x30\"  Piriformis stretch 3x30\"  Hip ADD squeeze push downs foam roll 10x2 3\"  BKFO/ER vs tband blue 3x10  Stand TA with shoulder extension grn 3x10  Lateral stepping 8'x8   MANUAL THERAPY  STM R piriformis/ITB in L SL 10' MANUAL THERAPY  STM R piriformis/ITB in L SL 10' MANUAL THERAPY  STM R piriformis/ITB in L SL 10' MANUAL THERAPY  STM R piriformis/ITB in L SL 10' MANUAL THERAPY  STM R piriformis/ITB in L SL 10'                 HEP: Clamshells, bridge, hip flexor stretch    Charges: man therapy, therapeutic ex 2       Total Timed Treatment: 40 min  Total Treatment Time: 45 min

## 2023-10-26 ENCOUNTER — OFFICE VISIT (OUTPATIENT)
Dept: PHYSICAL THERAPY | Age: 63
End: 2023-10-26
Attending: FAMILY MEDICINE
Payer: COMMERCIAL

## 2023-10-26 PROCEDURE — 97140 MANUAL THERAPY 1/> REGIONS: CPT

## 2023-10-26 PROCEDURE — 97110 THERAPEUTIC EXERCISES: CPT

## 2023-10-26 NOTE — PROGRESS NOTES
Diagnosis:   R hip pain      Referring Provider: Char Siemens  Date of Evaluation:    9/12/2023    Precautions:  Drug Allergy Next MD visit:   none scheduled  Date of Surgery: n/a   Insurance Primary/Secondary: [employer] [place] P[place] / N/A     # Auth Visits: med necessity            Subjective: Increased soreness throughout the low back. Pain: 2/10      Objective:     AROM: (* denotes performed with pain)  Hip Knee   Flexion: R 90; L 100  Extension: R 5; L 5  Abduction: R 20; L 30  ER: R 30; L 30  IR: R 10; L 10 Flexion: R 120; L 120  Extension: R 0; L 0           Assessment: Increased tingling in leg with lateral stepping.        Goals:   (to be met in 10 visits)  Pt will have improved hip AROM Flex to 100 deg and ABD to 30 deg to be able to don/doff shoes and perform car transfers without difficulty   Pt will improve hip ABD and ER strength to 5/5 to increase ease with standing and walking   Pt will be able to squat to  light objects around the house with <2/10 hip pain   Pt will improve functional hip strength to report ability to ascend/descend 1 flight of stairs reciprocally without use of handrail   Pt will demonstrate improved SLS to >30 seconds VERNELL to promote safety and decrease risk of falls on uneven surfaces such as grass and gravel   Pt will be independent and compliant with comprehensive HEP to maintain progress achieved in PT      Plan: Progress hip strength as tolerated  Date: 9/26/2023                TX#: 3/10 Date: 9/28/23                TX#: 4/10 Date:  10/3/2023               TX#: 5/10 Date: 10/12/2023  Tx#: 6/10 Date: 10/18/2023  Tx#: 7/10 Date: 10/26/2023  Tx#: 8/10   THERAPEUTIC EX  Bike 8'  Side lying hip flexor stretch 3x30\"  Hamstring stretch 3x30\"  Piriformis stretch 3x30\"  Hip add squeeze 3x10  BKFO/ER vs tband blue 3x10  Bridge 3x10 THERAPEUTIC EX  Bike 8'  Side lying hip flexor stretch 3x30\"  Hamstring stretch 3x30\"  Piriformis stretch 3x30\"  Hip add squeeze 3x10  BKFO/ER vs tband blue 3x10  Bridge 3x10 cues for breath support  Hip ADD squeeze push downs foam roll 10x 3\"  Half prone on evelated table femoral nerve floss 5x THERAPEUTIC EX  Bike 8'  Side lying hip flexor stretch 3x30\"  Hamstring stretch 3x30\"  Piriformis stretch 3x30\"  Hip ADD squeeze push downs foam roll 10x2 3\"  BKFO/ER vs tband blue 3x10  Bridge 3x10 cues for breath support  Lateral stepping 15'x2 THERAPEUTIC EX  Bike 8'  Side lying hip flexor stretch 3x30\"  Hamstring stretch 3x30\"  Piriformis stretch 3x30\"  Hip ADD squeeze push downs foam roll 10x2 3\"  BKFO/ER vs tband blue 3x10  Stand TA with shoulder extension grn 3x10 THERAPEUTIC EX  Bike 8'  Side lying hip flexor stretch 3x30\"  Hamstring stretch 3x30\"  Piriformis stretch 3x30\"  Hip ADD squeeze push downs foam roll 10x2 3\"  BKFO/ER vs tband blue 3x10  Stand TA with shoulder extension grn 3x10  Lateral stepping 8'x8 THERAPEUTIC EX  Bike 8'  Side lying hip flexor stretch 3x30\"  Hamstring stretch 3x30\"  Piriformis stretch 3x30\"  Hip ADD squeeze push downs foam roll 10x2 3\"  BKFO/ER vs tband blue 3x10  Stand TA with shoulder extension grn 3x10  Lateral stepping 8'x8   MANUAL THERAPY  STM R piriformis/ITB in L SL 10' MANUAL THERAPY  STM R piriformis/ITB in L SL 10' MANUAL THERAPY  STM R piriformis/ITB in L SL 10' MANUAL THERAPY  STM R piriformis/ITB in L SL 10' MANUAL THERAPY  STM R piriformis/ITB in L SL 10' MANUAL THERAPY  STM R piriformis/ITB in L SL 10'                   HEP: Clamshells, bridge, hip flexor stretch    Charges: man therapy, therapeutic ex 2       Total Timed Treatment: 40 min  Total Treatment Time: 45 min

## 2023-11-07 ENCOUNTER — OFFICE VISIT (OUTPATIENT)
Dept: PHYSICAL THERAPY | Age: 63
End: 2023-11-07
Attending: FAMILY MEDICINE
Payer: COMMERCIAL

## 2023-11-07 PROCEDURE — 97140 MANUAL THERAPY 1/> REGIONS: CPT

## 2023-11-07 PROCEDURE — 97110 THERAPEUTIC EXERCISES: CPT

## 2023-11-07 NOTE — PROGRESS NOTES
Diagnosis:   R hip pain      Referring Provider: Ana Rosa Jensen  Date of Evaluation:    9/12/2023    Precautions:  Drug Allergy Next MD visit:   none scheduled  Date of Surgery: n/a   Insurance Primary/Secondary: [employer] P[place] / N/A     # Auth Visits: med necessity            Subjective: Increased soreness in R hip since Thursday. Pain: 2/10      Objective:     AROM: (* denotes performed with pain)  Hip Knee   Flexion: R 100; L 100  Extension: R 5; L 5  Abduction: R 20; L 30  ER: R 30; L 30  IR: R 10; L 10 Flexion: R 120; L 120  Extension: R 0; L 0           Assessment: Increased tightness in piriformis. Improved hip flexion mobility.        Goals:   (to be met in 10 visits)  Pt will have improved hip AROM Flex to 100 deg and ABD to 30 deg to be able to don/doff shoes and perform car transfers without difficulty   Pt will improve hip ABD and ER strength to 5/5 to increase ease with standing and walking   Pt will be able to squat to  light objects around the house with <2/10 hip pain   Pt will improve functional hip strength to report ability to ascend/descend 1 flight of stairs reciprocally without use of handrail   Pt will demonstrate improved SLS to >30 seconds VERNELL to promote safety and decrease risk of falls on uneven surfaces such as grass and gravel   Pt will be independent and compliant with comprehensive HEP to maintain progress achieved in PT      Plan: Reassess  Date: 9/28/23                TX#: 4/10 Date:  10/3/2023               TX#: 5/10 Date: 10/12/2023  Tx#: 6/10 Date: 10/18/2023  Tx#: 7/10 Date: 10/26/2023  Tx#: 8/10 Date: 11/7/2023  Tx#: 9/10   THERAPEUTIC EX  Bike 8'  Side lying hip flexor stretch 3x30\"  Hamstring stretch 3x30\"  Piriformis stretch 3x30\"  Hip add squeeze 3x10  BKFO/ER vs tband blue 3x10  Bridge 3x10 cues for breath support  Hip ADD squeeze push downs foam roll 10x 3\"  Half prone on evelated table femoral nerve floss 5x THERAPEUTIC EX  Bike 8'  Side lying hip flexor stretch 3x30\"  Hamstring stretch 3x30\"  Piriformis stretch 3x30\"  Hip ADD squeeze push downs foam roll 10x2 3\"  BKFO/ER vs tband blue 3x10  Bridge 3x10 cues for breath support  Lateral stepping 15'x2 THERAPEUTIC EX  Bike 8'  Side lying hip flexor stretch 3x30\"  Hamstring stretch 3x30\"  Piriformis stretch 3x30\"  Hip ADD squeeze push downs foam roll 10x2 3\"  BKFO/ER vs tband blue 3x10  Stand TA with shoulder extension grn 3x10 THERAPEUTIC EX  Bike 8'  Side lying hip flexor stretch 3x30\"  Hamstring stretch 3x30\"  Piriformis stretch 3x30\"  Hip ADD squeeze push downs foam roll 10x2 3\"  BKFO/ER vs tband blue 3x10  Stand TA with shoulder extension grn 3x10  Lateral stepping 8'x8 THERAPEUTIC EX  Bike 8'  Side lying hip flexor stretch 3x30\"  Hamstring stretch 3x30\"  Piriformis stretch 3x30\"  Hip ADD squeeze push downs foam roll 10x2 3\"  BKFO/ER vs tband blue 3x10  Stand TA with shoulder extension grn 3x10  Lateral stepping 8'x8 THERAPEUTIC EX  Bike 8'  Side lying hip flexor stretch 3x30\"  Hamstring stretch 3x30\"  Piriformis stretch 3x30\"  Hip ADD squeeze push downs foam roll 10x2 3\"  BKFO/ER vs tband blue 3x10  Stand TA with shoulder extension blue 3x10   MANUAL THERAPY  STM R piriformis/ITB in L SL 10' MANUAL THERAPY  STM R piriformis/ITB in L SL 10' MANUAL THERAPY  STM R piriformis/ITB in L SL 10' MANUAL THERAPY  STM R piriformis/ITB in L SL 10' MANUAL THERAPY  STM R piriformis/ITB in L SL 10' MANUAL THERAPY  STM R piriformis/ITB in L SL 10'                   HEP: Clamshells, bridge, hip flexor stretch    Charges: man therapy, therapeutic ex 2       Total Timed Treatment: 40 min  Total Treatment Time: 45 min

## 2023-11-09 RX ORDER — TADALAFIL 10 MG/1
10 TABLET ORAL
Qty: 9 TABLET | Refills: 2 | Status: SHIPPED | OUTPATIENT
Start: 2023-11-09

## 2023-11-09 NOTE — TELEPHONE ENCOUNTER
Tadalafil 10 MG Oral Tab     Last office visit:  6/19/23      Future Appointments   Date Time Provider Horacio Carlson   11/15/2023  6:00 PM Mac Mckeon, PT [facility] [place]   11/28/2023 11:30 AM Macever Mckeon, PT [facility] [place]   12/5/2023 11:30 AM Macever Nelsonm, PT [facility] [place]   12/12/2023 11:30 AM Macever Mckeon, PT [facility] [place]   12/14/2023 11:30 AM Mac Mckeon, PT [facility] [place]   Last filled:  8/9/23  #9 with 2 refills   Last labs:  6/15/23      Due to be seen in December

## 2023-11-15 ENCOUNTER — OFFICE VISIT (OUTPATIENT)
Dept: PHYSICAL THERAPY | Age: 63
End: 2023-11-15
Attending: FAMILY MEDICINE
Payer: COMMERCIAL

## 2023-11-15 PROCEDURE — 97140 MANUAL THERAPY 1/> REGIONS: CPT

## 2023-11-15 PROCEDURE — 97110 THERAPEUTIC EXERCISES: CPT

## 2023-11-15 NOTE — PROGRESS NOTES
Progress Summary  Pt has attended 10 visits in Physical Therapy. Diagnosis:   R hip pain      Referring Provider: Suad Davalos  Date of Evaluation:    9/12/2023    Precautions:  Drug Allergy Next MD visit:   none scheduled  Date of Surgery: n/a   Insurance Primary/Secondary: [employer] PPO / N/A     # Auth Visits: med necessity            Subjective: Hip/sciatic pain much better. Feel some discomfort in quad area. Pain much better at rest, but still limited in heavier ADL tasks. Pain: 2/10      Objective:     AROM: (* denotes performed with pain)  Hip Knee   Flexion: R 100; L 100  Extension: R 5; L 5  Abduction: R 30; L 30  ER: R 30; L 30  IR: R 10; L 10 Flexion: R 120; L 120  Extension: R 0; L 0        Flexibility: Moderate quad and hip flexor tightness R, Mild piriformis tightness R    Strength/MMT:  Hip Knee   Flexion: R 5/5; L 5/5  Extension: R 4+/5; L 4+/5  Abduction: R 4+/5; L 4+/5  ER: R 4+/5; L 5/5  IR: R 5/5; L 5/5 Flexion: R 4+/5; L 5/5  Extension: R 4+/5; L 5/5         Assessment: Continued tightness in hip flexor/quad/piriformis. Increased ER strength. Gait improved but becomes antalgic when fatigued.         Goals:   (to be met in 10 visits)  Pt will have improved hip AROM Flex to 100 deg and ABD to 30 deg to be able to don/doff shoes and perform car transfers without difficulty -Met  Pt will improve hip ABD and ER strength to 5/5 to increase ease with standing and walking -On going  Pt will be able to squat to  light objects around the house with <2/10 hip pain -Met  Pt will improve functional hip strength to report ability to ascend/descend 1 flight of stairs reciprocally without use of handrail -Met  Pt will demonstrate improved SLS to >30 seconds VERNELL to promote safety and decrease risk of falls on uneven surfaces such as grass and gravel -On going  Pt will be independent and compliant with comprehensive HEP to maintain progress achieved in PT  -Met  Post LEFS Score  Post LEFS Score: 62.5 % (11/15/2023  5:56 PM)    30 % improvement    Plan: Continue skilled Physical Therapy 2 x/week or a total of 10 visits over a 90 day period. Treatment will include: manual therapy, therapeutic ex, neuro re-ed       Patient/Family/Caregiver was advised of these findings, precautions, and treatment options and has agreed to actively participate in planning and for this course of care. Thank you for your referral. If you have any questions, please contact me at Dept: [phone]. Sincerely,  Electronically signed by therapist: Adolfo Prater PT     Physician's certification required:  Yes  Please co-sign or sign and return this letter via fax as soon as possible to [phone]. I certify the need for these services furnished under this plan of treatment and while under my care.     X___________________________________________________ Date____________________    Certification From: 16/54/7845  To:2/13/2024    Date:  10/3/2023               TX#: 5/10 Date: 10/12/2023  Tx#: 6/10 Date: 10/18/2023  Tx#: 7/10 Date: 10/26/2023  Tx#: 8/10 Date: 11/7/2023  Tx#: 9/10 Date: 11/15/2023  Tx#: 10/10   THERAPEUTIC EX  Bike 8'  Side lying hip flexor stretch 3x30\"  Hamstring stretch 3x30\"  Piriformis stretch 3x30\"  Hip ADD squeeze push downs foam roll 10x2 3\"  BKFO/ER vs tband blue 3x10  Bridge 3x10 cues for breath support  Lateral stepping 15'x2 THERAPEUTIC EX  Bike 8'  Side lying hip flexor stretch 3x30\"  Hamstring stretch 3x30\"  Piriformis stretch 3x30\"  Hip ADD squeeze push downs foam roll 10x2 3\"  BKFO/ER vs tband blue 3x10  Stand TA with shoulder extension grn 3x10 THERAPEUTIC EX  Bike 8'  Side lying hip flexor stretch 3x30\"  Hamstring stretch 3x30\"  Piriformis stretch 3x30\"  Hip ADD squeeze push downs foam roll 10x2 3\"  BKFO/ER vs tband blue 3x10  Stand TA with shoulder extension grn 3x10  Lateral stepping 8'x8 THERAPEUTIC EX  Bike 8'  Side lying hip flexor stretch 3x30\"  Hamstring stretch 3x30\"  Piriformis stretch 3x30\"  Hip ADD squeeze push downs foam roll 10x2 3\"  BKFO/ER vs tband blue 3x10  Stand TA with shoulder extension grn 3x10  Lateral stepping 8'x8 THERAPEUTIC EX  Bike 8'  Side lying hip flexor stretch 3x30\"  Hamstring stretch 3x30\"  Piriformis stretch 3x30\"  Hip ADD squeeze push downs foam roll 10x2 3\"  BKFO/ER vs tband blue 3x10  Stand TA with shoulder extension blue 3x10 THERAPEUTIC EX  Bike 8'  Side lying hip flexor stretch 3x30\"  Hamstring stretch 3x30\"  Piriformis stretch 3x30\"  Hip ADD squeeze push downs foam roll 10x2 3\"  BKFO/ER vs tband blue 3x10  Stand TA with shoulder extension blue 3x10  Clamshells 3x10   MANUAL THERAPY  STM R piriformis/ITB in L SL 10' MANUAL THERAPY  STM R piriformis/ITB in L SL 10' MANUAL THERAPY  STM R piriformis/ITB in L SL 10' MANUAL THERAPY  STM R piriformis/ITB in L SL 10' MANUAL THERAPY  STM R piriformis/ITB in L SL 10' MANUAL THERAPY  STM R quad/hip flexor  10'                   HEP: Clamshells, bridge, hip flexor stretch, quad stretch    Charges: man therapy, therapeutic ex 2       Total Timed Treatment: 40 min  Total Treatment Time: 45 min

## 2023-11-21 RX ORDER — OMEPRAZOLE 40 MG/1
40 CAPSULE, DELAYED RELEASE ORAL DAILY
Qty: 90 CAPSULE | Refills: 2 | Status: SHIPPED | OUTPATIENT
Start: 2023-11-21

## 2023-11-21 NOTE — TELEPHONE ENCOUNTER
Last OV: 8/25/23  Last labs: 6/15/23    Future Appointments   Date Time Provider Horacio Carlson   11/28/2023 11:30 AM Paulette Rose, PT [facility] [place]   12/5/2023 11:30 AM Paulette Rose, PT [facility] [place]   12/12/2023 11:30 AM Paulette Rose, PT [facility] [place]   12/14/2023 11:30 AM Paulette Rose, PT [facility] [place]

## 2023-11-28 ENCOUNTER — OFFICE VISIT (OUTPATIENT)
Dept: PHYSICAL THERAPY | Age: 63
End: 2023-11-28
Attending: FAMILY MEDICINE
Payer: COMMERCIAL

## 2023-11-28 PROCEDURE — 97140 MANUAL THERAPY 1/> REGIONS: CPT

## 2023-11-28 PROCEDURE — 97110 THERAPEUTIC EXERCISES: CPT

## 2023-11-28 NOTE — PROGRESS NOTES
Diagnosis:   R hip pain      Referring Provider: Mary Duffy  Date of Evaluation:    9/12/2023    Precautions:  Drug Allergy Next MD visit:   none scheduled  Date of Surgery: n/a   Insurance Primary/Secondary: [employer] P[place] / N/A     # Auth Visits: med necessity            Subjective: Continued improvement in hip and thigh pain. Much less intense overall. Pain: 2/10      Objective:     AROM: (* denotes performed with pain)  Hip Knee   Flexion: R 100; L 100  Extension: R 5; L 5  Abduction: R 30; L 30  ER: R 30; L 30  IR: R 10; L 10 Flexion: R 120; L 120  Extension: R 0; L 0        Flexibility: Moderate quad and hip flexor tightness R, Mild piriformis tightness R    Strength/MMT:  Hip Knee   Flexion: R 5/5; L 5/5  Extension: R 4+/5; L 4+/5  Abduction: R 4+/5; L 4+/5  ER: R 4+/5; L 5/5  IR: R 5/5; L 5/5 Flexion: R 4+/5; L 5/5  Extension: R 4+/5; L 5/5         Assessment: Decreased tension and tenderness in the quad and piriformis. Much improved ability to perform lateral stepping, no longer antalgic at end of session.        Goals:   (to be met in 10 visits)  Pt will have improved hip AROM Flex to 100 deg and ABD to 30 deg to be able to don/doff shoes and perform car transfers without difficulty -Met  Pt will improve hip ABD and ER strength to 5/5 to increase ease with standing and walking -On going  Pt will be able to squat to  light objects around the house with <2/10 hip pain -Met  Pt will improve functional hip strength to report ability to ascend/descend 1 flight of stairs reciprocally without use of handrail -Met  Pt will demonstrate improved SLS to >30 seconds VERNELL to promote safety and decrease risk of falls on uneven surfaces such as grass and gravel -On going  Pt will be independent and compliant with comprehensive HEP to maintain progress achieved in PT  -Met  Post LEFS Score  Post LEFS Score: 62.5 % (11/15/2023  5:56 PM)    30 % improvement    Plan: Add tband to lateral stepping  Date: 10/12/2023  Tx#: 6/10 Date: 10/18/2023  Tx#: 7/10 Date: 10/26/2023  Tx#: 8/10 Date: 11/7/2023  Tx#: 9/10 Date: 11/15/2023  Tx#: 10/10 Date: 11/28/2023  Tx#: 11/20   THERAPEUTIC EX  Bike 8'  Side lying hip flexor stretch 3x30\"  Hamstring stretch 3x30\"  Piriformis stretch 3x30\"  Hip ADD squeeze push downs foam roll 10x2 3\"  BKFO/ER vs tband blue 3x10  Stand TA with shoulder extension grn 3x10 THERAPEUTIC EX  Bike 8'  Side lying hip flexor stretch 3x30\"  Hamstring stretch 3x30\"  Piriformis stretch 3x30\"  Hip ADD squeeze push downs foam roll 10x2 3\"  BKFO/ER vs tband blue 3x10  Stand TA with shoulder extension grn 3x10  Lateral stepping 8'x8 THERAPEUTIC EX  Bike 8'  Side lying hip flexor stretch 3x30\"  Hamstring stretch 3x30\"  Piriformis stretch 3x30\"  Hip ADD squeeze push downs foam roll 10x2 3\"  BKFO/ER vs tband blue 3x10  Stand TA with shoulder extension grn 3x10  Lateral stepping 8'x8 THERAPEUTIC EX  Bike 8'  Side lying hip flexor stretch 3x30\"  Hamstring stretch 3x30\"  Piriformis stretch 3x30\"  Hip ADD squeeze push downs foam roll 10x2 3\"  BKFO/ER vs tband blue 3x10  Stand TA with shoulder extension blue 3x10 THERAPEUTIC EX  Bike 8'  Side lying hip flexor stretch 3x30\"  Hamstring stretch 3x30\"  Piriformis stretch 3x30\"  Hip ADD squeeze push downs foam roll 10x2 3\"  BKFO/ER vs tband blue 3x10  Stand TA with shoulder extension blue 3x10  Clamshells 3x10 THERAPEUTIC EX  Bike 8'  Side lying hip flexor stretch 3x30\"  Hamstring stretch 3x30\"  Piriformis stretch 3x30\"  Hip ADD squeeze push downs foam roll 10x2 3\"  BKFO/ER vs tband blue 3x10  Stand TA with shoulder extension blue 3x10  Clamshells 3x10  Lateral stepping 15'x4   MANUAL THERAPY  STM R piriformis/ITB in L SL 10' MANUAL THERAPY  STM R piriformis/ITB in L SL 10' MANUAL THERAPY  STM R piriformis/ITB in L SL 10' MANUAL THERAPY  STM R piriformis/ITB in L SL 10' MANUAL THERAPY  STM R quad/hip flexor  10' MANUAL THERAPY  STM R quad/hip flexor  10' HEP: nnamdi Bryson, hip flexor stretch, quad stretch    Charges: man therapy, therapeutic ex 2       Total Timed Treatment: 40 min  Total Treatment Time: 45 min

## 2023-12-05 ENCOUNTER — OFFICE VISIT (OUTPATIENT)
Dept: PHYSICAL THERAPY | Age: 63
End: 2023-12-05
Attending: FAMILY MEDICINE
Payer: COMMERCIAL

## 2023-12-05 PROCEDURE — 97110 THERAPEUTIC EXERCISES: CPT

## 2023-12-05 PROCEDURE — 97140 MANUAL THERAPY 1/> REGIONS: CPT

## 2023-12-05 NOTE — PROGRESS NOTES
Diagnosis:   R hip pain      Referring Provider: Trudell Saint  Date of Evaluation:    9/12/2023    Precautions:  Drug Allergy Next MD visit:   none scheduled  Date of Surgery: n/a   Insurance Primary/Secondary: [employer] P[place] / N/A     # Auth Visits: med necessity            Subjective: Hip feels tight today. Still get some pain into thigh. Pain: 2/10      Objective:     AROM: (* denotes performed with pain)  Hip Knee   Flexion: R 100; L 100  Extension: R 5; L 5  Abduction: R 30; L 30  ER: R 30; L 30  IR: R 10; L 10 Flexion: R 120; L 120  Extension: R 0; L 0        Flexibility: Moderate quad and hip flexor tightness R, Mild piriformis tightness R    Strength/MMT:  Hip Knee   Flexion: R 5/5; L 5/5  Extension: R 4+/5; L 4+/5  Abduction: R 4+/5; L 4+/5  ER: R 4+/5; L 5/5  IR: R 5/5; L 5/5 Flexion: R 4+/5; L 5/5  Extension: R 4+/5; L 5/5         Assessment: Much improved gait with less antalgia. Works to fatigue with all exercises and well challenged.         Goals:   (to be met in 10 visits)  Pt will have improved hip AROM Flex to 100 deg and ABD to 30 deg to be able to don/doff shoes and perform car transfers without difficulty -Met  Pt will improve hip ABD and ER strength to 5/5 to increase ease with standing and walking -On going  Pt will be able to squat to  light objects around the house with <2/10 hip pain -Met  Pt will improve functional hip strength to report ability to ascend/descend 1 flight of stairs reciprocally without use of handrail -Met  Pt will demonstrate improved SLS to >30 seconds VERNELL to promote safety and decrease risk of falls on uneven surfaces such as grass and gravel -On going  Pt will be independent and compliant with comprehensive HEP to maintain progress achieved in PT  -Met  Post LEFS Score  Post LEFS Score: 62.5 % (11/15/2023  5:56 PM)    30 % improvement    Plan: Add tband to lateral stepping  Date: 10/18/2023  Tx#: 7/10 Date: 10/26/2023  Tx#: 8/10 Date: 11/7/2023  Tx#: 9/10 Date: 11/15/2023  Tx#: 10/10 Date: 11/28/2023  Tx#: 11/20 Date: 12/5/2023  Tx#: 12/20   THERAPEUTIC EX  Bike 8'  Side lying hip flexor stretch 3x30\"  Hamstring stretch 3x30\"  Piriformis stretch 3x30\"  Hip ADD squeeze push downs foam roll 10x2 3\"  BKFO/ER vs tband blue 3x10  Stand TA with shoulder extension grn 3x10  Lateral stepping 8'x8 THERAPEUTIC EX  Bike 8'  Side lying hip flexor stretch 3x30\"  Hamstring stretch 3x30\"  Piriformis stretch 3x30\"  Hip ADD squeeze push downs foam roll 10x2 3\"  BKFO/ER vs tband blue 3x10  Stand TA with shoulder extension grn 3x10  Lateral stepping 8'x8 THERAPEUTIC EX  Bike 8'  Side lying hip flexor stretch 3x30\"  Hamstring stretch 3x30\"  Piriformis stretch 3x30\"  Hip ADD squeeze push downs foam roll 10x2 3\"  BKFO/ER vs tband blue 3x10  Stand TA with shoulder extension blue 3x10 THERAPEUTIC EX  Bike 8'  Side lying hip flexor stretch 3x30\"  Hamstring stretch 3x30\"  Piriformis stretch 3x30\"  Hip ADD squeeze push downs foam roll 10x2 3\"  BKFO/ER vs tband blue 3x10  Stand TA with shoulder extension blue 3x10  Clamshells 3x10 THERAPEUTIC EX  Bike 8'  Side lying hip flexor stretch 3x30\"  Hamstring stretch 3x30\"  Piriformis stretch 3x30\"  Hip ADD squeeze push downs foam roll 10x2 3\"  BKFO/ER vs tband blue 3x10  Stand TA with shoulder extension blue 3x10  Clamshells 3x10  Lateral stepping 15'x4 THERAPEUTIC EX  Bike 8'  Side lying hip flexor stretch 3x30\"  Hamstring stretch 3x30\"  Piriformis stretch 3x30\"  Hip ADD squeeze push downs foam roll 10x2 3\"  BKFO/ER vs tband blue 3x10  Stand TA with shoulder extension blue 3x10  Clamshells 3x10  Lateral stepping 15'x4   MANUAL THERAPY  STM R piriformis/ITB in L SL 10' MANUAL THERAPY  STM R piriformis/ITB in L SL 10' MANUAL THERAPY  STM R piriformis/ITB in L SL 10' MANUAL THERAPY  STM R quad/hip flexor  10' MANUAL THERAPY  STM R quad/hip flexor  10' MANUAL THERAPY  STM R quad/hip flexor  10'                   HEP: Clamshells, bridge, hip flexor stretch, quad stretch    Charges: man therapy, therapeutic ex 2       Total Timed Treatment: 40 min  Total Treatment Time: 45 min

## 2023-12-12 ENCOUNTER — APPOINTMENT (OUTPATIENT)
Dept: PHYSICAL THERAPY | Age: 63
End: 2023-12-12
Attending: FAMILY MEDICINE
Payer: COMMERCIAL

## 2023-12-14 ENCOUNTER — OFFICE VISIT (OUTPATIENT)
Dept: PHYSICAL THERAPY | Age: 63
End: 2023-12-14
Attending: FAMILY MEDICINE
Payer: COMMERCIAL

## 2023-12-14 PROCEDURE — 97140 MANUAL THERAPY 1/> REGIONS: CPT

## 2023-12-14 PROCEDURE — 97110 THERAPEUTIC EXERCISES: CPT

## 2023-12-14 NOTE — PROGRESS NOTES
Diagnosis:   R hip pain      Referring Provider: Mayito Littlejohn  Date of Evaluation:    9/12/2023    Precautions:  Drug Allergy Next MD visit:   none scheduled  Date of Surgery: n/a   Insurance Primary/Secondary: [employer] / N/A     # Auth Visits: med necessity            Subjective: Balance feels a little off today. Pain: 2/10      Objective:     AROM: (* denotes performed with pain)  Hip Knee   Flexion: R 100; L 100  Extension: R 5; L 5  Abduction: R 30; L 30  ER: R 30; L 30  IR: R 10; L 10 Flexion: R 120; L 120  Extension: R 0; L 0        Flexibility: Moderate quad and hip flexor tightness R, Mild piriformis tightness R    Strength/MMT:  Hip Knee   Flexion: R 5/5; L 5/5  Extension: R 4+/5; L 4+/5  Abduction: R 4+/5; L 4+/5  ER: R 4+/5; L 5/5  IR: R 5/5; L 5/5 Flexion: R 4+/5; L 5/5  Extension: R 4+/5; L 5/5         Assessment: Continued improvement in flexibility. Improving tolerance to strength activity.        Goals:   (to be met in 10 visits)  Pt will have improved hip AROM Flex to 100 deg and ABD to 30 deg to be able to don/doff shoes and perform car transfers without difficulty -Met  Pt will improve hip ABD and ER strength to 5/5 to increase ease with standing and walking -On going  Pt will be able to squat to  light objects around the house with <2/10 hip pain -Met  Pt will improve functional hip strength to report ability to ascend/descend 1 flight of stairs reciprocally without use of handrail -Met  Pt will demonstrate improved SLS to >30 seconds VERNELL to promote safety and decrease risk of falls on uneven surfaces such as grass and gravel -On going  Pt will be independent and compliant with comprehensive HEP to maintain progress achieved in PT  -Met  Post LEFS Score  Post LEFS Score: 62.5 % (11/15/2023  5:56 PM)    30 % improvement    Plan: Add tband to lateral stepping  Date: 10/26/2023  Tx#: 8/10 Date: 11/7/2023  Tx#: 9/10 Date: 11/15/2023  Tx#: 10/10 Date: 11/28/2023  Tx#: 11/20 Date: 12/5/2023  Tx#: 12/20 Date: 12/14/2023  Tx#: 13/20   THERAPEUTIC EX  Bike 8'  Side lying hip flexor stretch 3x30\"  Hamstring stretch 3x30\"  Piriformis stretch 3x30\"  Hip ADD squeeze push downs foam roll 10x2 3\"  BKFO/ER vs tband blue 3x10  Stand TA with shoulder extension grn 3x10  Lateral stepping 8'x8 THERAPEUTIC EX  Bike 8'  Side lying hip flexor stretch 3x30\"  Hamstring stretch 3x30\"  Piriformis stretch 3x30\"  Hip ADD squeeze push downs foam roll 10x2 3\"  BKFO/ER vs tband blue 3x10  Stand TA with shoulder extension blue 3x10 THERAPEUTIC EX  Bike 8'  Side lying hip flexor stretch 3x30\"  Hamstring stretch 3x30\"  Piriformis stretch 3x30\"  Hip ADD squeeze push downs foam roll 10x2 3\"  BKFO/ER vs tband blue 3x10  Stand TA with shoulder extension blue 3x10  Clamshells 3x10 THERAPEUTIC EX  Bike 8'  Side lying hip flexor stretch 3x30\"  Hamstring stretch 3x30\"  Piriformis stretch 3x30\"  Hip ADD squeeze push downs foam roll 10x2 3\"  BKFO/ER vs tband blue 3x10  Stand TA with shoulder extension blue 3x10  Clamshells 3x10  Lateral stepping 15'x4 THERAPEUTIC EX  Bike 8'  Side lying hip flexor stretch 3x30\"  Hamstring stretch 3x30\"  Piriformis stretch 3x30\"  Hip ADD squeeze push downs foam roll 10x2 3\"  BKFO/ER vs tband blue 3x10  Stand TA with shoulder extension blue 3x10  Clamshells 3x10  Lateral stepping 15'x4 THERAPEUTIC EX  Bike 8'  Side lying hip flexor stretch 3x30\"  Hamstring stretch 3x30\"  Piriformis stretch 3x30\"  Hip ADD squeeze push downs foam roll 10x2 3\"  BKFO/ER vs tband blue 3x10  Stand TA with shoulder extension blue 3x10  Clamshells 3x10  Lateral stepping 15'x4   MANUAL THERAPY  STM R piriformis/ITB in L SL 10' MANUAL THERAPY  STM R piriformis/ITB in L SL 10' MANUAL THERAPY  STM R quad/hip flexor  10' MANUAL THERAPY  STM R quad/hip flexor  10' MANUAL THERAPY  STM R quad/hip flexor  10' MANUAL THERAPY  STM R quad/hip flexor  10'                   HEP: Clamshells, bridge, hip flexor stretch, quad stretch    Charges: man therapy, therapeutic ex 2       Total Timed Treatment: 40 min  Total Treatment Time: 45 min

## 2024-01-08 RX ORDER — VALSARTAN AND HYDROCHLOROTHIAZIDE 160; 12.5 MG/1; MG/1
1 TABLET, FILM COATED ORAL DAILY
Qty: 90 TABLET | Refills: 0 | Status: SHIPPED | OUTPATIENT
Start: 2024-01-08

## 2024-04-12 RX ORDER — VALSARTAN AND HYDROCHLOROTHIAZIDE 160; 12.5 MG/1; MG/1
1 TABLET, FILM COATED ORAL DAILY
Qty: 90 TABLET | Refills: 0 | Status: SHIPPED | OUTPATIENT
Start: 2024-04-12

## 2024-04-12 NOTE — TELEPHONE ENCOUNTER
Protocol:  Hypertension Medications Protocol [id number]/12/2024 08:19 AM   Protocol Details CMP or BMP in past 12 months    Last BP reading less than 140/90    In person appointment or virtual visit in the past 12 mos or appointment in next 3 mos    EGFRCR or GFRNAA > 50

## 2024-05-24 ENCOUNTER — TELEPHONE (OUTPATIENT)
Dept: FAMILY MEDICINE CLINIC | Facility: CLINIC | Age: 64
End: 2024-05-24

## 2024-05-24 DIAGNOSIS — I10 ESSENTIAL HYPERTENSION: Primary | ICD-10-CM

## 2024-05-24 DIAGNOSIS — Z12.5 SCREENING FOR PROSTATE CANCER: ICD-10-CM

## 2024-05-24 DIAGNOSIS — E78.00 HYPERCHOLESTEROLEMIA: ICD-10-CM

## 2024-05-24 NOTE — TELEPHONE ENCOUNTER
Last cmp, lipid, and psa done on 6/15/23. Reorder these? Anything else you recommend?    Future Appointments   Date Time Provider Department Center   6/24/2024  2:15 PM Bakari Carr, DO [facility] [place]

## 2024-05-24 NOTE — TELEPHONE ENCOUNTER
Spoke with patient and he verbalized understanding.     Future Appointments   Date Time Provider Department Center   6/18/2024  8:15 AM REF [facility]   6/24/2024  2:15 PM Bakari Carr, DO [facility] [place]

## 2024-05-24 NOTE — TELEPHONE ENCOUNTER
PATIENT HAS PHYSICAL APPOINTMENT 6-24-24, WANTS TO KNOW What kind of blood work to do prior to the physical?, NO ORDERS IN COMPUTER, CALL PATIENT BACK

## 2024-06-18 ENCOUNTER — LABORATORY ENCOUNTER (OUTPATIENT)
Dept: LAB | Age: 64
End: 2024-06-18
Attending: FAMILY MEDICINE

## 2024-06-18 DIAGNOSIS — E78.00 HYPERCHOLESTEROLEMIA: ICD-10-CM

## 2024-06-18 DIAGNOSIS — I10 ESSENTIAL HYPERTENSION: ICD-10-CM

## 2024-06-18 DIAGNOSIS — Z12.5 SCREENING FOR PROSTATE CANCER: ICD-10-CM

## 2024-06-18 LAB
ALBUMIN SERPL-MCNC: 3.9 G/DL (ref 3.4–5)
ALBUMIN/GLOB SERPL: 1.2 {RATIO} (ref 1–2)
ALP LIVER SERPL-CCNC: 57 U/L
ALT SERPL-CCNC: 32 U/L
ANION GAP SERPL CALC-SCNC: 7 MMOL/L (ref 0–18)
AST SERPL-CCNC: 19 U/L (ref 15–37)
BILIRUB SERPL-MCNC: 0.9 MG/DL (ref 0.1–2)
BUN BLD-MCNC: 18 MG/DL (ref 9–23)
CALCIUM BLD-MCNC: 8.7 MG/DL (ref 8.5–10.1)
CHLORIDE SERPL-SCNC: 106 MMOL/L (ref 98–112)
CHOLEST SERPL-MCNC: 183 MG/DL (ref ?–200)
CO2 SERPL-SCNC: 25 MMOL/L (ref 21–32)
COMPLEXED PSA SERPL-MCNC: 0.46 NG/ML (ref ?–4)
CREAT BLD-MCNC: 1.46 MG/DL
EGFRCR SERPLBLD CKD-EPI 2021: 54 ML/MIN/1.73M2 (ref 60–?)
FASTING PATIENT LIPID ANSWER: YES
FASTING STATUS PATIENT QL REPORTED: YES
GLOBULIN PLAS-MCNC: 3.3 G/DL (ref 2.8–4.4)
GLUCOSE BLD-MCNC: 92 MG/DL (ref 70–99)
HDLC SERPL-MCNC: 54 MG/DL (ref 40–59)
LDLC SERPL CALC-MCNC: 107 MG/DL (ref ?–100)
NONHDLC SERPL-MCNC: 129 MG/DL (ref ?–130)
OSMOLALITY SERPL CALC.SUM OF ELEC: 288 MOSM/KG (ref 275–295)
POTASSIUM SERPL-SCNC: 4 MMOL/L (ref 3.5–5.1)
PROT SERPL-MCNC: 7.2 G/DL (ref 6.4–8.2)
SODIUM SERPL-SCNC: 138 MMOL/L (ref 136–145)
TRIGL SERPL-MCNC: 126 MG/DL (ref 30–149)
VLDLC SERPL CALC-MCNC: 21 MG/DL (ref 0–30)

## 2024-06-18 PROCEDURE — 80061 LIPID PANEL: CPT | Performed by: FAMILY MEDICINE

## 2024-06-18 PROCEDURE — G0103 PSA SCREENING: HCPCS | Performed by: FAMILY MEDICINE

## 2024-06-18 PROCEDURE — 80053 COMPREHEN METABOLIC PANEL: CPT | Performed by: FAMILY MEDICINE

## 2024-06-24 ENCOUNTER — OFFICE VISIT (OUTPATIENT)
Dept: FAMILY MEDICINE CLINIC | Facility: CLINIC | Age: 64
End: 2024-06-24

## 2024-06-24 VITALS
HEIGHT: 70.4 IN | SYSTOLIC BLOOD PRESSURE: 132 MMHG | RESPIRATION RATE: 18 BRPM | TEMPERATURE: 98 F | DIASTOLIC BLOOD PRESSURE: 84 MMHG | WEIGHT: 288 LBS | HEART RATE: 73 BPM | OXYGEN SATURATION: 97 % | BODY MASS INDEX: 40.77 KG/M2

## 2024-06-24 DIAGNOSIS — E66.01 MORBID OBESITY WITH BMI OF 40.0-44.9, ADULT (HCC): ICD-10-CM

## 2024-06-24 DIAGNOSIS — G47.33 OSA ON CPAP: ICD-10-CM

## 2024-06-24 DIAGNOSIS — I10 ESSENTIAL HYPERTENSION: ICD-10-CM

## 2024-06-24 DIAGNOSIS — K22.70 BARRETT'S ESOPHAGUS WITHOUT DYSPLASIA: ICD-10-CM

## 2024-06-24 DIAGNOSIS — Z00.00 PREVENTATIVE HEALTH CARE: Primary | ICD-10-CM

## 2024-06-24 DIAGNOSIS — N18.31 CHRONIC KIDNEY DISEASE (CKD) STAGE G3A/A1, MODERATELY DECREASED GLOMERULAR FILTRATION RATE (GFR) BETWEEN 45-59 ML/MIN/1.73 SQUARE METER AND ALBUMINURIA CREATININE RATIO LESS THAN 30 MG/G (HCC): ICD-10-CM

## 2024-06-24 DIAGNOSIS — E78.00 HYPERCHOLESTEROLEMIA: ICD-10-CM

## 2024-06-24 DIAGNOSIS — M48.061 SPINAL STENOSIS OF LUMBAR REGION WITHOUT NEUROGENIC CLAUDICATION: ICD-10-CM

## 2024-06-24 DIAGNOSIS — K21.9 GASTROESOPHAGEAL REFLUX DISEASE WITHOUT ESOPHAGITIS: ICD-10-CM

## 2024-06-24 DIAGNOSIS — N52.9 VASCULOGENIC ERECTILE DYSFUNCTION, UNSPECIFIED VASCULOGENIC ERECTILE DYSFUNCTION TYPE: ICD-10-CM

## 2024-06-24 PROCEDURE — 3079F DIAST BP 80-89 MM HG: CPT | Performed by: FAMILY MEDICINE

## 2024-06-24 PROCEDURE — 99396 PREV VISIT EST AGE 40-64: CPT | Performed by: FAMILY MEDICINE

## 2024-06-24 PROCEDURE — 3075F SYST BP GE 130 - 139MM HG: CPT | Performed by: FAMILY MEDICINE

## 2024-06-24 PROCEDURE — 3008F BODY MASS INDEX DOCD: CPT | Performed by: FAMILY MEDICINE

## 2024-06-24 NOTE — PATIENT INSTRUCTIONS
1. Preventative health care  I reviewed age-appropriate preventive health screening exams as well as immunizations.  Will check [employer] records for date of shingles vaccine as stated by patient    2. MERRILL on CPAP  Discussed importance of nightly use of CPAP as well as cardiovascular complication of untreated disease.  I have asked patient to check with his supplier for an updated auto titrating CPAP device.  I also suggested patient may want to have an in person evaluation which he declines at this time    3. Essential hypertension  I reviewed goals for blood pressure as well as conservative management of hypertension including sodium restriction, daily aerobic activity, alcohol moderation, smoking cessation, and maintaining ideal body weight.  Continue current meds and monitoring    4. Hypercholesterolemia  Discussed recent labs.  Reviewed goals for lipids, focus on weight loss    5. Spinal stenosis of lumbar region without neurogenic claudication  Activity as tolerated, monitor clinically, focus on weight loss    6. Foster's esophagus without dysplasia- EGD 9/7/22  Continue acid suppression, repeat EGD 2027    7. Chronic kidney disease (CKD) stage G3a/A1, moderately decreased glomerular filtration rate (GFR) between 45-59 mL/min/1.73 square meter and albuminuria creatinine ratio less than 30 mg/g (HCC)  Discussed importance of increased daily fluid intake and avoidance of potentially nephrotoxic drugs such as frequent NSAID use    8. Gastroesophageal reflux disease without esophagitis  Anti-reflux measures reviewed.  Avoid large meals. Allow at least 3 hrs between eating and laying down to facilitate gastric emptying.  Limit caffeine to 2 servings per day.  Avoid spicy or acidic foods and liquids.  Moderation of alcohol.  Avoid nsaids and aspirin.  May use Tylenol prn pain  See #6    9. Vasculogenic erectile dysfunction, unspecified vasculogenic erectile dysfunction type  Monitor clinically    10. Morbid obesity  with BMI of 40.0-44.9, adult ([facility])  Discussed importance of lower carbohydrate food choices, eating behavior modification, avoidance of starches and daily aerobic activity
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
## 2024-07-11 RX ORDER — VALSARTAN AND HYDROCHLOROTHIAZIDE 160; 12.5 MG/1; MG/1
1 TABLET, FILM COATED ORAL DAILY
Qty: 90 TABLET | Refills: 0 | Status: SHIPPED | OUTPATIENT
Start: 2024-07-11

## 2024-07-11 NOTE — TELEPHONE ENCOUNTER
valsartan-hydroCHLOROthiazide 160-12.5 MG Oral Tab   Hypertension Medications Protocol [id number]/11/2024 09:09 AM   Protocol Details CMP or BMP in past 12 months    Last BP reading less than 140/90    In person appointment or virtual visit in the past 12 mos or appointment in next 3 mos    EGFRCR or GFRNAA > 50   Last office visit:  6/24/24  No future appointments.  Last filled:  4/12/24  Last labs:  6/18/24

## 2024-10-14 RX ORDER — OMEPRAZOLE 40 MG/1
40 CAPSULE, DELAYED RELEASE ORAL DAILY
Qty: 90 CAPSULE | Refills: 2 | Status: SHIPPED | OUTPATIENT
Start: 2024-10-14

## 2024-10-14 NOTE — TELEPHONE ENCOUNTER
Last office visit:6/24/24   Last filled:11/21/23 #90 with 2 RF  Last labs:6/18/24    No future appointments.    Protocol:      Gastrointestional Medication Protocol [id number]/14/2024 10:40 AM   Protocol Details In person appointment or virtual visit in the past 12 mos or appointment in next 3 mos

## 2024-11-20 ENCOUNTER — TELEPHONE (OUTPATIENT)
Dept: FAMILY MEDICINE CLINIC | Facility: CLINIC | Age: 64
End: 2024-11-20

## 2024-11-20 RX ORDER — VALSARTAN AND HYDROCHLOROTHIAZIDE 160; 12.5 MG/1; MG/1
1 TABLET, FILM COATED ORAL DAILY
Qty: 90 TABLET | Refills: 0 | Status: SHIPPED | OUTPATIENT
Start: 2024-11-20

## 2025-01-13 ENCOUNTER — TELEPHONE (OUTPATIENT)
Dept: FAMILY MEDICINE CLINIC | Facility: CLINIC | Age: 65
End: 2025-01-13

## 2025-01-13 DIAGNOSIS — I10 ESSENTIAL HYPERTENSION: Primary | ICD-10-CM

## 2025-01-13 NOTE — TELEPHONE ENCOUNTER
Last labs 6/18/24 - Lipids, PSA, CMP    Dr Carr to advise if pt is due for labs at this time and if so, this RN can order and schedule

## 2025-01-13 NOTE — TELEPHONE ENCOUNTER
Patient scheduled for physical 1/21/25 would like to come in this week for labs if Dr can place orders.

## 2025-01-15 ENCOUNTER — LAB ENCOUNTER (OUTPATIENT)
Dept: LAB | Age: 65
End: 2025-01-15
Attending: FAMILY MEDICINE
Payer: COMMERCIAL

## 2025-01-15 DIAGNOSIS — I10 ESSENTIAL HYPERTENSION: ICD-10-CM

## 2025-01-15 LAB
ANION GAP SERPL CALC-SCNC: 6 MMOL/L (ref 0–18)
BUN BLD-MCNC: 17 MG/DL (ref 9–23)
CALCIUM BLD-MCNC: 10 MG/DL (ref 8.7–10.6)
CHLORIDE SERPL-SCNC: 106 MMOL/L (ref 98–112)
CO2 SERPL-SCNC: 29 MMOL/L (ref 21–32)
CREAT BLD-MCNC: 1.34 MG/DL
EGFRCR SERPLBLD CKD-EPI 2021: 59 ML/MIN/1.73M2 (ref 60–?)
FASTING STATUS PATIENT QL REPORTED: NO
GLUCOSE BLD-MCNC: 93 MG/DL (ref 70–99)
OSMOLALITY SERPL CALC.SUM OF ELEC: 293 MOSM/KG (ref 275–295)
POTASSIUM SERPL-SCNC: 4.8 MMOL/L (ref 3.5–5.1)
SODIUM SERPL-SCNC: 141 MMOL/L (ref 136–145)

## 2025-01-15 PROCEDURE — 80048 BASIC METABOLIC PNL TOTAL CA: CPT | Performed by: FAMILY MEDICINE

## 2025-01-16 DIAGNOSIS — I10 ESSENTIAL HYPERTENSION: ICD-10-CM

## 2025-01-16 DIAGNOSIS — N18.31 CHRONIC KIDNEY DISEASE (CKD) STAGE G3A/A1, MODERATELY DECREASED GLOMERULAR FILTRATION RATE (GFR) BETWEEN 45-59 ML/MIN/1.73 SQUARE METER AND ALBUMINURIA CREATININE RATIO LESS THAN 30 MG/G (HCC): Primary | ICD-10-CM

## 2025-01-21 ENCOUNTER — OFFICE VISIT (OUTPATIENT)
Dept: FAMILY MEDICINE CLINIC | Facility: CLINIC | Age: 65
End: 2025-01-21
Payer: COMMERCIAL

## 2025-01-21 VITALS
OXYGEN SATURATION: 98 % | SYSTOLIC BLOOD PRESSURE: 136 MMHG | BODY MASS INDEX: 42.09 KG/M2 | RESPIRATION RATE: 18 BRPM | TEMPERATURE: 98 F | HEIGHT: 70.08 IN | HEART RATE: 65 BPM | DIASTOLIC BLOOD PRESSURE: 80 MMHG | WEIGHT: 294 LBS

## 2025-01-21 DIAGNOSIS — K22.70 BARRETT'S ESOPHAGUS WITHOUT DYSPLASIA: ICD-10-CM

## 2025-01-21 DIAGNOSIS — K21.9 GASTROESOPHAGEAL REFLUX DISEASE WITHOUT ESOPHAGITIS: ICD-10-CM

## 2025-01-21 DIAGNOSIS — N40.1 BENIGN PROSTATIC HYPERPLASIA WITH POST-VOID DRIBBLING: ICD-10-CM

## 2025-01-21 DIAGNOSIS — E66.01 MORBID OBESITY WITH BMI OF 40.0-44.9, ADULT (HCC): ICD-10-CM

## 2025-01-21 DIAGNOSIS — F10.10 EXCESSIVE DRINKING ALCOHOL: ICD-10-CM

## 2025-01-21 DIAGNOSIS — F51.04 PSYCHOPHYSIOLOGICAL INSOMNIA: ICD-10-CM

## 2025-01-21 DIAGNOSIS — Z00.00 PREVENTATIVE HEALTH CARE: Primary | ICD-10-CM

## 2025-01-21 DIAGNOSIS — N39.43 BENIGN PROSTATIC HYPERPLASIA WITH POST-VOID DRIBBLING: ICD-10-CM

## 2025-01-21 DIAGNOSIS — I10 ESSENTIAL HYPERTENSION: ICD-10-CM

## 2025-01-21 DIAGNOSIS — E78.00 HYPERCHOLESTEROLEMIA: ICD-10-CM

## 2025-01-21 DIAGNOSIS — G47.33 OSA ON CPAP: ICD-10-CM
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
## 2025-01-21 NOTE — PATIENT INSTRUCTIONS
1. Preventative health care  I reviewed age-appropriate preventative health screening exams as well as immunizations.  Will defer pneumococcal vaccine until 65th birthday.  I have asked patient to obtain dates of his reported shingles vaccine    2. Essential hypertension  I reviewed goals for blood pressure as well as conservative management of hypertension including sodium restriction, daily aerobic activity, alcohol moderation, smoking cessation, and maintaining ideal body weight.  Continue current meds and monitoring    3. Hypercholesterolemia  Discussed goals for lipids.  After clinically    4. MERRILL on CPAP  Reviewed signs and symptoms of obstructive sleep apnea as well as cardiovascular complication of untreated disease    5. Foster's esophagus without dysplasia- EGD 9/7/22  Continue omeprazole.  Repeat EGD 2027    6. Gastroesophageal reflux disease without esophagitis  Anti-reflux measures reviewed.  Avoid large meals. Allow at least 3 hrs between eating and laying down to facilitate gastric emptying.  Limit caffeine to 2 servings per day.  Avoid spicy or acidic foods and liquids.  Moderation of alcohol.  Avoid nsaids and aspirin.  May use Tylenol prn pain      7. Benign prostatic hyperplasia with post-void dribbling  Reviewed signs and symptoms of lower urinary tract outlet obstruction as well as treatment options.  Discussed contributing factors including excessive alcohol and caffeine.  Increased fluid intake important, reviewed medications including risk, benefits, side effects, anticipated response, onset of action and duration of treatment.  Patient declines medication at this time, monitor clinically    8. Psychophysiological insomnia  Discussed conservative management strategies for sleep onset.  Discussed the effects of alcohol on sleep quality.    9. Excessive drinking alcohol  Patient counseled on alcohol moderation    10.  Morbid obesity with BMI 40.0-44.9, adult  I reviewed weight loss management  strategies including lower carbohydrate food choices, avoiding starches, eating behavior modification and importance of daily aerobic activity as tolerated with a target of 180 minutes/week

## 2025-02-20 ENCOUNTER — PATIENT MESSAGE (OUTPATIENT)
Dept: FAMILY MEDICINE CLINIC | Facility: CLINIC | Age: 65
End: 2025-02-20

## 2025-02-20 RX ORDER — VALSARTAN AND HYDROCHLOROTHIAZIDE 160; 12.5 MG/1; MG/1
1 TABLET, FILM COATED ORAL DAILY
Qty: 90 TABLET | Refills: 1 | Status: SHIPPED | OUTPATIENT
Start: 2025-02-20

## 2025-02-20 NOTE — TELEPHONE ENCOUNTER
Hypertension Medications Protocol [id number]/20/2025 10:23 AM   Protocol Details CMP or BMP in past 12 months    Last BP reading less than 140/90    In person appointment or virtual visit in the past 12 mos or appointment in next 3 mos    EGFRCR or GFRNAA > 50    Medication is active on med list        Last office visit:  01/21/25  Last refill:  11/20/24  #90, no refills  Last bmp:  01/15/25    BP Readings from Last 3 Encounters:   01/21/25 136/80   06/24/24 132/84   10/09/23 122/80     No future appointments.

## 2025-05-23 RX ORDER — VALSARTAN AND HYDROCHLOROTHIAZIDE 160; 12.5 MG/1; MG/1
1 TABLET, FILM COATED ORAL DAILY
Qty: 90 TABLET | Refills: 0 | Status: SHIPPED | OUTPATIENT
Start: 2025-05-23

## 2025-05-23 NOTE — TELEPHONE ENCOUNTER
Hypertension Medications Protocol [id number]/23/2025 09:13 AM   Protocol Details CMP or BMP in past 12 months    Last BP reading less than 140/90    In person appointment or virtual visit in the past 12 mos or appointment in next 3 mos    EGFRCR or GFRNAA > 50    Medication is active on med list          Last office visit:  01/21/25  Last refill:  02/20/25  #90,1 refill  Last cmp:  01/15/25  BP Readings from Last 3 Encounters:   01/21/25 136/80   06/24/24 132/84   10/09/23 122/80     No future appointments.